# Patient Record
Sex: FEMALE | Race: WHITE | NOT HISPANIC OR LATINO
[De-identification: names, ages, dates, MRNs, and addresses within clinical notes are randomized per-mention and may not be internally consistent; named-entity substitution may affect disease eponyms.]

---

## 2019-06-07 ENCOUNTER — TRANSCRIPTION ENCOUNTER (OUTPATIENT)
Age: 49
End: 2019-06-07

## 2022-10-01 ENCOUNTER — INPATIENT (INPATIENT)
Facility: HOSPITAL | Age: 52
LOS: 0 days | Discharge: HOME | DRG: 395 | End: 2022-10-02
Attending: SURGERY | Admitting: SURGERY
Payer: COMMERCIAL

## 2022-10-01 VITALS
SYSTOLIC BLOOD PRESSURE: 133 MMHG | WEIGHT: 190.04 LBS | DIASTOLIC BLOOD PRESSURE: 85 MMHG | HEIGHT: 66 IN | TEMPERATURE: 98 F | HEART RATE: 86 BPM | OXYGEN SATURATION: 99 % | RESPIRATION RATE: 18 BRPM

## 2022-10-01 DIAGNOSIS — Z87.81 PERSONAL HISTORY OF (HEALED) TRAUMATIC FRACTURE: Chronic | ICD-10-CM

## 2022-10-01 DIAGNOSIS — K80.50 CALCULUS OF BILE DUCT WITHOUT CHOLANGITIS OR CHOLECYSTITIS WITHOUT OBSTRUCTION: ICD-10-CM

## 2022-10-01 LAB
ALBUMIN SERPL ELPH-MCNC: 4.5 G/DL — SIGNIFICANT CHANGE UP (ref 3.3–5.2)
ALP SERPL-CCNC: 96 U/L — SIGNIFICANT CHANGE UP (ref 40–120)
ALT FLD-CCNC: 11 U/L — SIGNIFICANT CHANGE UP
ANION GAP SERPL CALC-SCNC: 12 MMOL/L — SIGNIFICANT CHANGE UP (ref 5–17)
APPEARANCE UR: CLEAR — SIGNIFICANT CHANGE UP
APTT BLD: 34 SEC — SIGNIFICANT CHANGE UP (ref 27.5–35.5)
AST SERPL-CCNC: 13 U/L — SIGNIFICANT CHANGE UP
BACTERIA # UR AUTO: ABNORMAL
BASOPHILS # BLD AUTO: 0.03 K/UL — SIGNIFICANT CHANGE UP (ref 0–0.2)
BASOPHILS NFR BLD AUTO: 0.4 % — SIGNIFICANT CHANGE UP (ref 0–2)
BILIRUB SERPL-MCNC: 0.4 MG/DL — SIGNIFICANT CHANGE UP (ref 0.4–2)
BILIRUB UR-MCNC: NEGATIVE — SIGNIFICANT CHANGE UP
BLD GP AB SCN SERPL QL: SIGNIFICANT CHANGE UP
BUN SERPL-MCNC: 15.3 MG/DL — SIGNIFICANT CHANGE UP (ref 8–20)
CALCIUM SERPL-MCNC: 9.4 MG/DL — SIGNIFICANT CHANGE UP (ref 8.4–10.5)
CHLORIDE SERPL-SCNC: 103 MMOL/L — SIGNIFICANT CHANGE UP (ref 98–107)
CO2 SERPL-SCNC: 26 MMOL/L — SIGNIFICANT CHANGE UP (ref 22–29)
COLOR SPEC: YELLOW — SIGNIFICANT CHANGE UP
CREAT SERPL-MCNC: 0.76 MG/DL — SIGNIFICANT CHANGE UP (ref 0.5–1.3)
DIFF PNL FLD: NEGATIVE — SIGNIFICANT CHANGE UP
EGFR: 94 ML/MIN/1.73M2 — SIGNIFICANT CHANGE UP
EOSINOPHIL # BLD AUTO: 0.23 K/UL — SIGNIFICANT CHANGE UP (ref 0–0.5)
EOSINOPHIL NFR BLD AUTO: 3.2 % — SIGNIFICANT CHANGE UP (ref 0–6)
EPI CELLS # UR: SIGNIFICANT CHANGE UP
GLUCOSE SERPL-MCNC: 90 MG/DL — SIGNIFICANT CHANGE UP (ref 70–99)
GLUCOSE UR QL: NEGATIVE MG/DL — SIGNIFICANT CHANGE UP
HCG SERPL-ACNC: 6.5 MIU/ML — SIGNIFICANT CHANGE UP
HCT VFR BLD CALC: 40.5 % — SIGNIFICANT CHANGE UP (ref 34.5–45)
HGB BLD-MCNC: 13.3 G/DL — SIGNIFICANT CHANGE UP (ref 11.5–15.5)
IMM GRANULOCYTES NFR BLD AUTO: 0.3 % — SIGNIFICANT CHANGE UP (ref 0–0.9)
INR BLD: 1.09 RATIO — SIGNIFICANT CHANGE UP (ref 0.88–1.16)
KETONES UR-MCNC: NEGATIVE — SIGNIFICANT CHANGE UP
LEUKOCYTE ESTERASE UR-ACNC: NEGATIVE — SIGNIFICANT CHANGE UP
LIDOCAIN IGE QN: 28 U/L — SIGNIFICANT CHANGE UP (ref 22–51)
LYMPHOCYTES # BLD AUTO: 1.41 K/UL — SIGNIFICANT CHANGE UP (ref 1–3.3)
LYMPHOCYTES # BLD AUTO: 19.4 % — SIGNIFICANT CHANGE UP (ref 13–44)
MCHC RBC-ENTMCNC: 28.9 PG — SIGNIFICANT CHANGE UP (ref 27–34)
MCHC RBC-ENTMCNC: 32.8 GM/DL — SIGNIFICANT CHANGE UP (ref 32–36)
MCV RBC AUTO: 87.9 FL — SIGNIFICANT CHANGE UP (ref 80–100)
MONOCYTES # BLD AUTO: 0.54 K/UL — SIGNIFICANT CHANGE UP (ref 0–0.9)
MONOCYTES NFR BLD AUTO: 7.4 % — SIGNIFICANT CHANGE UP (ref 2–14)
NEUTROPHILS # BLD AUTO: 5.03 K/UL — SIGNIFICANT CHANGE UP (ref 1.8–7.4)
NEUTROPHILS NFR BLD AUTO: 69.3 % — SIGNIFICANT CHANGE UP (ref 43–77)
NITRITE UR-MCNC: NEGATIVE — SIGNIFICANT CHANGE UP
PH UR: 5 — SIGNIFICANT CHANGE UP (ref 5–8)
PLATELET # BLD AUTO: 236 K/UL — SIGNIFICANT CHANGE UP (ref 150–400)
POTASSIUM SERPL-MCNC: 4.2 MMOL/L — SIGNIFICANT CHANGE UP (ref 3.5–5.3)
POTASSIUM SERPL-SCNC: 4.2 MMOL/L — SIGNIFICANT CHANGE UP (ref 3.5–5.3)
PROT SERPL-MCNC: 7.5 G/DL — SIGNIFICANT CHANGE UP (ref 6.6–8.7)
PROT UR-MCNC: NEGATIVE — SIGNIFICANT CHANGE UP
PROTHROM AB SERPL-ACNC: 12.6 SEC — SIGNIFICANT CHANGE UP (ref 10.5–13.4)
RBC # BLD: 4.61 M/UL — SIGNIFICANT CHANGE UP (ref 3.8–5.2)
RBC # FLD: 12.7 % — SIGNIFICANT CHANGE UP (ref 10.3–14.5)
RBC CASTS # UR COMP ASSIST: SIGNIFICANT CHANGE UP /HPF (ref 0–4)
SARS-COV-2 RNA SPEC QL NAA+PROBE: SIGNIFICANT CHANGE UP
SODIUM SERPL-SCNC: 140 MMOL/L — SIGNIFICANT CHANGE UP (ref 135–145)
SP GR SPEC: 1.01 — SIGNIFICANT CHANGE UP (ref 1.01–1.02)
UROBILINOGEN FLD QL: NEGATIVE MG/DL — SIGNIFICANT CHANGE UP
WBC # BLD: 7.26 K/UL — SIGNIFICANT CHANGE UP (ref 3.8–10.5)
WBC # FLD AUTO: 7.26 K/UL — SIGNIFICANT CHANGE UP (ref 3.8–10.5)
WBC UR QL: SIGNIFICANT CHANGE UP /HPF (ref 0–5)

## 2022-10-01 PROCEDURE — 74177 CT ABD & PELVIS W/CONTRAST: CPT | Mod: 26,ME

## 2022-10-01 PROCEDURE — 93010 ELECTROCARDIOGRAM REPORT: CPT

## 2022-10-01 PROCEDURE — 76705 ECHO EXAM OF ABDOMEN: CPT | Mod: 26,59

## 2022-10-01 PROCEDURE — G1004: CPT

## 2022-10-01 PROCEDURE — 76856 US EXAM PELVIC COMPLETE: CPT | Mod: 26,59

## 2022-10-01 PROCEDURE — 93975 VASCULAR STUDY: CPT | Mod: 26

## 2022-10-01 PROCEDURE — 76830 TRANSVAGINAL US NON-OB: CPT | Mod: 26

## 2022-10-01 PROCEDURE — 71045 X-RAY EXAM CHEST 1 VIEW: CPT | Mod: 26

## 2022-10-01 PROCEDURE — 99284 EMERGENCY DEPT VISIT MOD MDM: CPT

## 2022-10-01 RX ORDER — SODIUM CHLORIDE 9 MG/ML
1000 INJECTION INTRAMUSCULAR; INTRAVENOUS; SUBCUTANEOUS ONCE
Refills: 0 | Status: COMPLETED | OUTPATIENT
Start: 2022-10-01 | End: 2022-10-01

## 2022-10-01 RX ORDER — ACETAMINOPHEN 500 MG
1000 TABLET ORAL ONCE
Refills: 0 | Status: COMPLETED | OUTPATIENT
Start: 2022-10-01 | End: 2022-10-01

## 2022-10-01 RX ORDER — SODIUM CHLORIDE 9 MG/ML
1000 INJECTION, SOLUTION INTRAVENOUS
Refills: 0 | Status: DISCONTINUED | OUTPATIENT
Start: 2022-10-01 | End: 2022-10-02

## 2022-10-01 RX ORDER — KETOROLAC TROMETHAMINE 30 MG/ML
15 SYRINGE (ML) INJECTION EVERY 4 HOURS
Refills: 0 | Status: DISCONTINUED | OUTPATIENT
Start: 2022-10-01 | End: 2022-10-02

## 2022-10-01 RX ORDER — ONDANSETRON 8 MG/1
4 TABLET, FILM COATED ORAL ONCE
Refills: 0 | Status: COMPLETED | OUTPATIENT
Start: 2022-10-01 | End: 2022-10-01

## 2022-10-01 RX ORDER — MORPHINE SULFATE 50 MG/1
4 CAPSULE, EXTENDED RELEASE ORAL ONCE
Refills: 0 | Status: DISCONTINUED | OUTPATIENT
Start: 2022-10-01 | End: 2022-10-01

## 2022-10-01 RX ORDER — ACETAMINOPHEN 500 MG
650 TABLET ORAL EVERY 6 HOURS
Refills: 0 | Status: DISCONTINUED | OUTPATIENT
Start: 2022-10-01 | End: 2022-10-02

## 2022-10-01 RX ORDER — ENOXAPARIN SODIUM 100 MG/ML
40 INJECTION SUBCUTANEOUS EVERY 24 HOURS
Refills: 0 | Status: DISCONTINUED | OUTPATIENT
Start: 2022-10-02 | End: 2022-10-02

## 2022-10-01 RX ORDER — TRAMADOL HYDROCHLORIDE 50 MG/1
50 TABLET ORAL EVERY 6 HOURS
Refills: 0 | Status: DISCONTINUED | OUTPATIENT
Start: 2022-10-01 | End: 2022-10-02

## 2022-10-01 RX ADMIN — Medication 15 MILLIGRAM(S): at 20:07

## 2022-10-01 RX ADMIN — MORPHINE SULFATE 4 MILLIGRAM(S): 50 CAPSULE, EXTENDED RELEASE ORAL at 11:52

## 2022-10-01 RX ADMIN — SODIUM CHLORIDE 1000 MILLILITER(S): 9 INJECTION INTRAMUSCULAR; INTRAVENOUS; SUBCUTANEOUS at 12:55

## 2022-10-01 RX ADMIN — SODIUM CHLORIDE 1000 MILLILITER(S): 9 INJECTION INTRAMUSCULAR; INTRAVENOUS; SUBCUTANEOUS at 11:52

## 2022-10-01 RX ADMIN — Medication 400 MILLIGRAM(S): at 11:53

## 2022-10-01 RX ADMIN — ONDANSETRON 4 MILLIGRAM(S): 8 TABLET, FILM COATED ORAL at 13:08

## 2022-10-01 NOTE — H&P ADULT - ATTENDING COMMENTS
52yo F w PMH of hyperthyroidism (resolved?) presents with 3 days of worsening abdominal pain. Localized to RUQ to RLQ, described as sharp and constant. No alleviating or aggravating factors, but reproducible on palpation. Denies nausea, vomiting, fevers, chills, chest pain, or SOB. Last meal yesterday afternoon, BM's and urination normal. Has never had a colonoscopy.   Hemodynamic intact  Bilateral BS  Abdomen soft , quite tender in RUQ with limited mild rebound and no guarding  Cholelithiasis  In this setting in absence of LFTs elevation and other symptoms, biliary colic is possible, but less likely. Most likely in the differential setting, this patient has torsion of appendices epiploicae in the area of hepatic flexure  Will admit and observe and act accordingly based on clinical picture

## 2022-10-01 NOTE — H&P ADULT - HISTORY OF PRESENT ILLNESS
50yo F w PMH of hyperthyroidism (resolved?) presents with 3 days of worsening abdominal pain. Localized to RUQ to RLQ, described as sharp and constant. No alleviating or aggravating factors, but reproducible on palpation. Denies nausea, vomiting, fevers, chills, chest pain, or SOB. Last meal yesterday afternoon, BM's and urination normal. Has never had a colonoscopy.

## 2022-10-01 NOTE — ED STATDOCS - GASTROINTESTINAL, MLM
abdomen soft, +Coyle's sign at RUQ and tenderness to palpation RLQ with guarding, and non-distended. abdomen soft, non-tender, and non-distended. Bowel sounds present.

## 2022-10-01 NOTE — ED STATDOCS - OBJECTIVE STATEMENT
52 y/o female with no pmhx, c/o constant R sided abdominal pain x 2 days, which has progressively worsened. Unable to get comfortable and pain worse with sitting. Denies dysuria. No hx of gallstone or kidney stones. Denies nausea or vomiting. No allergies.

## 2022-10-01 NOTE — ED ADULT NURSE NOTE - OBJECTIVE STATEMENT
Pt is here with RUQ pain since Thursday.  Pt denies N/V/D, denies fever.  P/s pain came on suddenly.  #20G IVHL inserted to LAC, medicated for pain as ordered.  IVF infusing without s/s redness or swelling noted.

## 2022-10-01 NOTE — H&P ADULT - NSHPPHYSICALEXAM_GEN_ALL_CORE
GENERAL: Sitting in recliner in NAD, well-developed, following commands  HEAD:  Atraumatic, Normocephalic  EYES: EOMI, conjunctiva and sclera clear  NECK: Supple, full ROM  CHEST/LUNG: Unlabored, no conversational dyspnea  HEART: Regular rate and rhythm;   ABDOMEN: Soft,  significantly tender in RUQ Nondistended; no rebound or guarding  EXTREMITIES: WWP throughout No clubbing, cyanosis, or edema  PSYCH: AAOx3  NEUROLOGY: non-focal  SKIN: No rashes or lesions

## 2022-10-01 NOTE — H&P ADULT - NSHPLABSRESULTS_GEN_ALL_CORE
Vital Signs Last 24 Hrs  T(C): 36.7 (01 Oct 2022 17:30), Max: 36.9 (01 Oct 2022 09:58)  T(F): 98.1 (01 Oct 2022 17:30), Max: 98.5 (01 Oct 2022 09:58)  HR: 58 (01 Oct 2022 17:30) (58 - 86)  BP: 130/85 (01 Oct 2022 17:30) (119/61 - 133/85)  BP(mean): --  RR: 18 (01 Oct 2022 17:30) (17 - 18)  SpO2: 64% (01 Oct 2022 17:30) (64% - 99%)    Parameters below as of 01 Oct 2022 17:30  Patient On (Oxygen Delivery Method): room air        LABS:                        13.3   7.26  )-----------( 236      ( 01 Oct 2022 11:55 )             40.5     10    140  |  103  |  15.3  ----------------------------<  90  4.2   |  26.0  |  0.76    Ca    9.4      01 Oct 2022 11:55    TPro  7.5  /  Alb  4.5  /  TBili  0.4  /  DBili  x   /  AST  13  /  ALT  11  /  AlkPhos  96  10-01      Urinalysis Basic - ( 01 Oct 2022 13:03 )    Color: Yellow / Appearance: Clear / S.015 / pH: x  Gluc: x / Ketone: Negative  / Bili: Negative / Urobili: Negative mg/dL   Blood: x / Protein: Negative / Nitrite: Negative   Leuk Esterase: Negative / RBC: 0-2 /HPF / WBC 0-2 /HPF   Sq Epi: x / Non Sq Epi: Occasional / Bacteria: Occasional      IMAGING:  RUQ U/S  IMPRESSION:  Technically limited by bowel gas and patient's difficulty with breath-holding. No evidence of acute cholecystitis. Cholelithiasis. Question gallbladder polyp.    DARLEEN JOSEPH MD; Attending Radiologist  This document has been electronically signed. Oct 1 2022 12:20PM        CT A/P  IMPRESSION:  Normal appendix.  Heterogeneous right adnexal mass with adjacent small amount of fluid. This mass cannot be well characterized on CT. Recommend further evaluation with pelvic ultrasound to rule out right ovarian torsion or other adnexal pathology.  Fat stranding/edema adjacent to the right transverse colon without underlying colonic abnormality. Findings are nonspecific and differential diagnosis includes focal fat necrosis, focal omental necrosis or epiploic appendagitis.  Moderate hiatal hernia.  VERTEBRAL BODY ANALYSIS: No Vertebral fracture or low bone density identified.    ITA LIN MD; Attending Radiologist  This document has been electronically signed. Oct 1 2022 5:58PM

## 2022-10-01 NOTE — ED STATDOCS - ATTENDING APP SHARED VISIT CONTRIBUTION OF CARE
FE Villareal: see mdm    I have personally performed a face to face diagnostic evaluation on this patient.  I have reviewed the ACP note and agree with the history, exam, and plan of care, except as noted.   My medical decision making and observations are found above.

## 2022-10-01 NOTE — ED STATDOCS - NS ED ATTENDING STATEMENT MOD
This was a shared visit with the MIGUEL ANGEL. I reviewed and verified the documentation and independently performed the documented:

## 2022-10-01 NOTE — H&P ADULT - ASSESSMENT
52yo F w PMH of hyperthyroidism (resolved?) presents with 3 days of worsening abdominal pain, localized to RUQ to RLQ. Imaging with gallstones, pain reproducible and concerning for biliary colic. No leukocytosis or fevers. CT also with concerns for fat/omental necrosis vs epiploic appendagitis and possible adnexal pathology. Will admit and plan for cholecystectomy.     # Biliary Colic  - Admit to ACS under Dr. Tierney  - Adding on for lap yogesh tomorrow  - PRN pain meds, ketorolac ATC  - CLD, NPO @ midnight w IVF  - Pelvic U/S to eval for adnexal pathology  - AM Labs  - DVT ppx      Patient seen and plan discussed with Dr. Tierney who agrees.

## 2022-10-01 NOTE — ED STATDOCS - CLINICAL SUMMARY MEDICAL DECISION MAKING FREE TEXT BOX
52 y/o female p/w abdomina discomfort. On exam has +Coyle's sign and also tenderness to palpation at RLQ. Will check labs, imagining, treat discomfort, follow studies, reassess, and dispo.

## 2022-10-01 NOTE — ED STATDOCS - PROGRESS NOTE DETAILS
PT evaluated by intake physician. HPI/PE/ROS as noted above. Will follow up plan per intake physician. Pt reassessed. Pt states she is still in some pain in the RUQ area, does not want anything else for pain at this time. Labs and US results explained. CT pending. FE Villareal: d/w patient that her hcg was indeterminate zone, notes that it returns elevated due to hx of pituitary tumor, d/w rads OK to proceed to CT, patient still with discomfort though imprvoed to 4/10 after meds. CT findings acknowledged. VSS. No ttp in the RLQ, +ttp in the RUQ. Pt will be admitted to sx for further assessment and evaluation.

## 2022-10-02 ENCOUNTER — TRANSCRIPTION ENCOUNTER (OUTPATIENT)
Age: 52
End: 2022-10-02

## 2022-10-02 VITALS
HEART RATE: 60 BPM | RESPIRATION RATE: 18 BRPM | OXYGEN SATURATION: 95 % | DIASTOLIC BLOOD PRESSURE: 80 MMHG | TEMPERATURE: 98 F | SYSTOLIC BLOOD PRESSURE: 128 MMHG

## 2022-10-02 LAB
ANION GAP SERPL CALC-SCNC: 11 MMOL/L — SIGNIFICANT CHANGE UP (ref 5–17)
BASOPHILS # BLD AUTO: 0.03 K/UL — SIGNIFICANT CHANGE UP (ref 0–0.2)
BASOPHILS NFR BLD AUTO: 0.5 % — SIGNIFICANT CHANGE UP (ref 0–2)
BUN SERPL-MCNC: 10.6 MG/DL — SIGNIFICANT CHANGE UP (ref 8–20)
CALCIUM SERPL-MCNC: 9.2 MG/DL — SIGNIFICANT CHANGE UP (ref 8.4–10.5)
CHLORIDE SERPL-SCNC: 105 MMOL/L — SIGNIFICANT CHANGE UP (ref 98–107)
CO2 SERPL-SCNC: 24 MMOL/L — SIGNIFICANT CHANGE UP (ref 22–29)
CREAT SERPL-MCNC: 0.72 MG/DL — SIGNIFICANT CHANGE UP (ref 0.5–1.3)
CULTURE RESULTS: SIGNIFICANT CHANGE UP
EGFR: 100 ML/MIN/1.73M2 — SIGNIFICANT CHANGE UP
EOSINOPHIL # BLD AUTO: 0.2 K/UL — SIGNIFICANT CHANGE UP (ref 0–0.5)
EOSINOPHIL NFR BLD AUTO: 3.5 % — SIGNIFICANT CHANGE UP (ref 0–6)
GLUCOSE SERPL-MCNC: 77 MG/DL — SIGNIFICANT CHANGE UP (ref 70–99)
HCG SERPL-ACNC: 6.2 MIU/ML — SIGNIFICANT CHANGE UP
HCT VFR BLD CALC: 38.3 % — SIGNIFICANT CHANGE UP (ref 34.5–45)
HGB BLD-MCNC: 12.6 G/DL — SIGNIFICANT CHANGE UP (ref 11.5–15.5)
IMM GRANULOCYTES NFR BLD AUTO: 0.2 % — SIGNIFICANT CHANGE UP (ref 0–0.9)
LYMPHOCYTES # BLD AUTO: 1.16 K/UL — SIGNIFICANT CHANGE UP (ref 1–3.3)
LYMPHOCYTES # BLD AUTO: 20.2 % — SIGNIFICANT CHANGE UP (ref 13–44)
MAGNESIUM SERPL-MCNC: 2 MG/DL — SIGNIFICANT CHANGE UP (ref 1.6–2.6)
MCHC RBC-ENTMCNC: 29.1 PG — SIGNIFICANT CHANGE UP (ref 27–34)
MCHC RBC-ENTMCNC: 32.9 GM/DL — SIGNIFICANT CHANGE UP (ref 32–36)
MCV RBC AUTO: 88.5 FL — SIGNIFICANT CHANGE UP (ref 80–100)
MONOCYTES # BLD AUTO: 0.44 K/UL — SIGNIFICANT CHANGE UP (ref 0–0.9)
MONOCYTES NFR BLD AUTO: 7.7 % — SIGNIFICANT CHANGE UP (ref 2–14)
NEUTROPHILS # BLD AUTO: 3.9 K/UL — SIGNIFICANT CHANGE UP (ref 1.8–7.4)
NEUTROPHILS NFR BLD AUTO: 67.9 % — SIGNIFICANT CHANGE UP (ref 43–77)
PHOSPHATE SERPL-MCNC: 3.3 MG/DL — SIGNIFICANT CHANGE UP (ref 2.4–4.7)
PLATELET # BLD AUTO: 194 K/UL — SIGNIFICANT CHANGE UP (ref 150–400)
POTASSIUM SERPL-MCNC: 3.9 MMOL/L — SIGNIFICANT CHANGE UP (ref 3.5–5.3)
POTASSIUM SERPL-SCNC: 3.9 MMOL/L — SIGNIFICANT CHANGE UP (ref 3.5–5.3)
RBC # BLD: 4.33 M/UL — SIGNIFICANT CHANGE UP (ref 3.8–5.2)
RBC # FLD: 12.4 % — SIGNIFICANT CHANGE UP (ref 10.3–14.5)
SODIUM SERPL-SCNC: 140 MMOL/L — SIGNIFICANT CHANGE UP (ref 135–145)
SPECIMEN SOURCE: SIGNIFICANT CHANGE UP
WBC # BLD: 5.74 K/UL — SIGNIFICANT CHANGE UP (ref 3.8–10.5)
WBC # FLD AUTO: 5.74 K/UL — SIGNIFICANT CHANGE UP (ref 3.8–10.5)

## 2022-10-02 PROCEDURE — 86850 RBC ANTIBODY SCREEN: CPT

## 2022-10-02 PROCEDURE — G1004: CPT

## 2022-10-02 PROCEDURE — 96375 TX/PRO/DX INJ NEW DRUG ADDON: CPT

## 2022-10-02 PROCEDURE — 83690 ASSAY OF LIPASE: CPT

## 2022-10-02 PROCEDURE — 74177 CT ABD & PELVIS W/CONTRAST: CPT | Mod: ME

## 2022-10-02 PROCEDURE — 85610 PROTHROMBIN TIME: CPT

## 2022-10-02 PROCEDURE — 84100 ASSAY OF PHOSPHORUS: CPT

## 2022-10-02 PROCEDURE — 99285 EMERGENCY DEPT VISIT HI MDM: CPT | Mod: 25

## 2022-10-02 PROCEDURE — 83735 ASSAY OF MAGNESIUM: CPT

## 2022-10-02 PROCEDURE — 85025 COMPLETE CBC W/AUTO DIFF WBC: CPT

## 2022-10-02 PROCEDURE — 80048 BASIC METABOLIC PNL TOTAL CA: CPT

## 2022-10-02 PROCEDURE — 71045 X-RAY EXAM CHEST 1 VIEW: CPT

## 2022-10-02 PROCEDURE — 87086 URINE CULTURE/COLONY COUNT: CPT

## 2022-10-02 PROCEDURE — 76856 US EXAM PELVIC COMPLETE: CPT

## 2022-10-02 PROCEDURE — U0005: CPT

## 2022-10-02 PROCEDURE — 93975 VASCULAR STUDY: CPT

## 2022-10-02 PROCEDURE — 76705 ECHO EXAM OF ABDOMEN: CPT

## 2022-10-02 PROCEDURE — 85730 THROMBOPLASTIN TIME PARTIAL: CPT

## 2022-10-02 PROCEDURE — 96374 THER/PROPH/DIAG INJ IV PUSH: CPT

## 2022-10-02 PROCEDURE — 81001 URINALYSIS AUTO W/SCOPE: CPT

## 2022-10-02 PROCEDURE — 84702 CHORIONIC GONADOTROPIN TEST: CPT

## 2022-10-02 PROCEDURE — U0003: CPT

## 2022-10-02 PROCEDURE — 80053 COMPREHEN METABOLIC PANEL: CPT

## 2022-10-02 PROCEDURE — 99231 SBSQ HOSP IP/OBS SF/LOW 25: CPT | Mod: GC

## 2022-10-02 PROCEDURE — 93005 ELECTROCARDIOGRAM TRACING: CPT

## 2022-10-02 PROCEDURE — 36415 COLL VENOUS BLD VENIPUNCTURE: CPT

## 2022-10-02 PROCEDURE — 86901 BLOOD TYPING SEROLOGIC RH(D): CPT

## 2022-10-02 PROCEDURE — 76830 TRANSVAGINAL US NON-OB: CPT

## 2022-10-02 PROCEDURE — 86900 BLOOD TYPING SEROLOGIC ABO: CPT

## 2022-10-02 RX ORDER — IBUPROFEN 200 MG
1 TABLET ORAL
Qty: 56 | Refills: 0
Start: 2022-10-02 | End: 2022-10-15

## 2022-10-02 RX ORDER — INFLUENZA VIRUS VACCINE 15; 15; 15; 15 UG/.5ML; UG/.5ML; UG/.5ML; UG/.5ML
0.5 SUSPENSION INTRAMUSCULAR ONCE
Refills: 0 | Status: DISCONTINUED | OUTPATIENT
Start: 2022-10-02 | End: 2022-10-02

## 2022-10-02 RX ORDER — PANTOPRAZOLE SODIUM 20 MG/1
1 TABLET, DELAYED RELEASE ORAL
Qty: 0 | Refills: 0 | DISCHARGE
Start: 2022-10-02

## 2022-10-02 RX ORDER — ACETAMINOPHEN 500 MG
2 TABLET ORAL
Qty: 240 | Refills: 0
Start: 2022-10-02 | End: 2022-10-31

## 2022-10-02 RX ORDER — PANTOPRAZOLE SODIUM 20 MG/1
1 TABLET, DELAYED RELEASE ORAL
Qty: 30 | Refills: 0
Start: 2022-10-02

## 2022-10-02 RX ORDER — PANTOPRAZOLE SODIUM 20 MG/1
40 TABLET, DELAYED RELEASE ORAL
Refills: 0 | Status: DISCONTINUED | OUTPATIENT
Start: 2022-10-02 | End: 2022-10-02

## 2022-10-02 RX ADMIN — SODIUM CHLORIDE 120 MILLILITER(S): 9 INJECTION, SOLUTION INTRAVENOUS at 01:30

## 2022-10-02 RX ADMIN — Medication 15 MILLIGRAM(S): at 13:00

## 2022-10-02 RX ADMIN — Medication 15 MILLIGRAM(S): at 17:31

## 2022-10-02 RX ADMIN — Medication 15 MILLIGRAM(S): at 13:30

## 2022-10-02 RX ADMIN — PANTOPRAZOLE SODIUM 40 MILLIGRAM(S): 20 TABLET, DELAYED RELEASE ORAL at 12:59

## 2022-10-02 RX ADMIN — SODIUM CHLORIDE 120 MILLILITER(S): 9 INJECTION, SOLUTION INTRAVENOUS at 09:19

## 2022-10-02 NOTE — DISCHARGE NOTE PROVIDER - NSDCMRMEDTOKEN_GEN_ALL_CORE_FT
acetaminophen 325 mg oral tablet: 2 tab(s) orally every 6 hours, As needed, Moderate Pain (4 - 6)   mg oral tablet: 1 tab(s) orally every 6 hours   pantoprazole 40 mg oral delayed release tablet: 1 tab(s) orally once a day (before a meal)

## 2022-10-02 NOTE — DISCHARGE NOTE PROVIDER - HOSPITAL COURSE
Ms. Diaz is a 52 yo F who presents to the ED with a R sided abdominal pain. No additional associated symptoms. CT A/P demonstrates non-specific adjacent to the R transverse colon, on review appeared to be omental infarct. Low concern for appendicitis. Patient abdominal pain improved with NSAIDs. Labs wnl. Hemodynamically stable. Patient tolerated PO diet. Medically clear for discharge.

## 2022-10-02 NOTE — DISCHARGE NOTE NURSING/CASE MANAGEMENT/SOCIAL WORK - PATIENT PORTAL LINK FT
You can access the FollowMyHealth Patient Portal offered by [facility] by registering at the following website: [email or web address]. By joining [facility]’s FollowMyHealth portal, you will also be able to view your health information using other applications (apps) compatible with our system.

## 2022-10-02 NOTE — DISCHARGE NOTE PROVIDER - NSDCCPCAREPLAN_GEN_ALL_CORE_FT
PRINCIPAL DISCHARGE DIAGNOSIS  Diagnosis: Omental infarction  Assessment and Plan of Treatment:

## 2022-10-02 NOTE — DISCHARGE NOTE PROVIDER - CARE PROVIDER_API CALL
Kwaku West (MD)  Surgery  [address], [address] [place] [address]  Phone: [phone]  Fax: [phone]  Follow Up Time: Routine

## 2022-10-02 NOTE — PROGRESS NOTE ADULT - SUBJECTIVE AND OBJECTIVE BOX
HPI/OVERNIGHT EVENTS: pain improved otherwise stable, afebrile    Vital Signs Last 24 Hrs  T(C): 36.9 (02 Oct 2022 04:00), Max: 36.9 (01 Oct 2022 09:58)  T(F): 98.5 (02 Oct 2022 04:00), Max: 98.5 (01 Oct 2022 09:58)  HR: 67 (02 Oct 2022 04:00) (58 - 86)  BP: 121/76 (02 Oct 2022 04:00) (119/61 - 135/83)  BP(mean): --  RR: 16 (02 Oct 2022 04:00) (16 - 18)  SpO2: 98% (02 Oct 2022 04:00) (64% - 99%)    Parameters below as of 01 Oct 2022 23:53  Patient On (Oxygen Delivery Method): room air        I&O's Detail    01 Oct 2022 07:01  -  02 Oct 2022 06:26  --------------------------------------------------------  IN:    Lactated Ringers: 720 mL  Total IN: 720 mL    OUT:  Total OUT: 0 mL    Total NET: 720 mL          Constitutional: patient resting comfortably in bed, in no acute distress  HEENT: EOMI, PERRLA, MMM.  Respiratory: Non labored breathing on RA  Cardiovascular: RRR  Gastrointestinal: Abdomen soft, mild tenderness, non-distended, no rebound tenderness / guarding  Musculoskeletal: No joint pain, swelling or deformity; no limitation of movement  Vascular: Extremities warm and well perfused.     LABS:                        13.3   7.26  )-----------( 236      ( 01 Oct 2022 11:55 )             40.5     10-01    140  |  103  |  15.3  ----------------------------<  90  4.2   |  26.0  |  0.76    Ca    9.4      01 Oct 2022 11:55    TPro  7.5  /  Alb  4.5  /  TBili  0.4  /  DBili  x   /  AST  13  /  ALT  11  /  AlkPhos  96  10-01    PT/INR - ( 01 Oct 2022 20:25 )   PT: 12.6 sec;   INR: 1.09 ratio         PTT - ( 01 Oct 2022 20:25 )  PTT:34.0 sec  Urinalysis Basic - ( 01 Oct 2022 13:03 )    Color: Yellow / Appearance: Clear / S.015 / pH: x  Gluc: x / Ketone: Negative  / Bili: Negative / Urobili: Negative mg/dL   Blood: x / Protein: Negative / Nitrite: Negative   Leuk Esterase: Negative / RBC: 0-2 /HPF / WBC 0-2 /HPF   Sq Epi: x / Non Sq Epi: Occasional / Bacteria: Occasional        MEDICATIONS  (STANDING):  enoxaparin Injectable 40 milliGRAM(s) SubCutaneous every 24 hours  ketorolac   Injectable 15 milliGRAM(s) IV Push every 4 hours  lactated ringers. 1000 milliLiter(s) (120 mL/Hr) IV Continuous <Continuous>    MEDICATIONS  (PRN):  acetaminophen     Tablet .. 650 milliGRAM(s) Oral every 6 hours PRN Moderate Pain (4 - 6)  traMADol 50 milliGRAM(s) Oral every 6 hours PRN Severe Pain (7 - 10)      MICRO:   Cultures     STUDIES:   EKG, CXR, U/S, CT, MRI

## 2022-10-02 NOTE — DISCHARGE NOTE PROVIDER - NSDCFUADDINST_GEN_ALL_CORE_FT
Please take over the counter NSAIDs/Motrin every 6 hours , take tylenol as needed. Please return to ED if you experience worsening abdominal pain, fever/chills, inability to tolerate food by mouth.

## 2022-10-02 NOTE — PROGRESS NOTE ADULT - ATTENDING COMMENTS
Agree with above assessment.  The patient was seen and examined by myself with the surgical resident. The patient is still with right sided abdominal pain although less than yesterday.  She has no nausea, no vomit, no fevers or chills.  Abdomen is soft with right mid abdominal tenderness, no guarding, no rebound.  Abd/pel CT scan reveals findings compatible with omental infarction. Will advance PO, anti-inflammatories, possible D/C home later today.

## 2022-11-22 PROBLEM — E05.90 THYROTOXICOSIS, UNSPECIFIED WITHOUT THYROTOXIC CRISIS OR STORM: Chronic | Status: ACTIVE | Noted: 2022-10-01

## 2022-11-22 PROBLEM — Z00.00 ENCOUNTER FOR PREVENTIVE HEALTH EXAMINATION: Status: ACTIVE | Noted: 2022-11-22

## 2022-12-09 ENCOUNTER — APPOINTMENT (OUTPATIENT)
Dept: GYNECOLOGIC ONCOLOGY | Facility: CLINIC | Age: 52
End: 2022-12-09

## 2022-12-09 VITALS
WEIGHT: 197 LBS | HEIGHT: 66 IN | BODY MASS INDEX: 31.66 KG/M2 | DIASTOLIC BLOOD PRESSURE: 86 MMHG | HEART RATE: 88 BPM | OXYGEN SATURATION: 98 % | SYSTOLIC BLOOD PRESSURE: 130 MMHG

## 2022-12-09 DIAGNOSIS — Z86.39 PERSONAL HISTORY OF OTHER ENDOCRINE, NUTRITIONAL AND METABOLIC DISEASE: ICD-10-CM

## 2022-12-09 DIAGNOSIS — Z86.018 PERSONAL HISTORY OF OTHER BENIGN NEOPLASM: ICD-10-CM

## 2022-12-09 DIAGNOSIS — Z78.9 OTHER SPECIFIED HEALTH STATUS: ICD-10-CM

## 2022-12-09 PROCEDURE — 99204 OFFICE O/P NEW MOD 45 MIN: CPT

## 2022-12-09 RX ORDER — IBUPROFEN 600 MG/1
600 TABLET, FILM COATED ORAL
Qty: 56 | Refills: 0 | Status: DISCONTINUED | COMMUNITY
Start: 2022-10-02

## 2022-12-09 RX ORDER — PANTOPRAZOLE 40 MG/1
40 TABLET, DELAYED RELEASE ORAL
Qty: 30 | Refills: 0 | Status: DISCONTINUED | COMMUNITY
Start: 2022-10-02

## 2022-12-09 NOTE — DISCUSSION/SUMMARY
[Reviewed Radiology Report(s)] : Radiology reports were reviewed. [Reviewed Radiology Film/Image(s)] : Images from radiology studies were reviewed and examined. [Discuss Alternatives/Risks/Benefits w/Patient] : All alternatives, risks, and benefits were discussed with the patient/family and all questions were answered.  Patient expressed good understanding and appreciates the importance of follow up as recommended.

## 2022-12-11 ENCOUNTER — FORM ENCOUNTER (OUTPATIENT)
Age: 52
End: 2022-12-11

## 2022-12-11 ENCOUNTER — TRANSCRIPTION ENCOUNTER (OUTPATIENT)
Age: 52
End: 2022-12-11

## 2022-12-15 LAB
CA 125 (LABCORP): 16.1 U/ML
CANCER AG19-9 SERPL-ACNC: 7 U/ML
CEA SERPL-MCNC: 1 NG/ML
HE4X: 42.8 PMOL/L
POSTMENOPAUSAL ROMA: 1.04
PREMENOPAUSAL ROMA: 0.53
ROMA COMMENT: NORMAL

## 2022-12-19 ENCOUNTER — APPOINTMENT (OUTPATIENT)
Dept: GASTROENTEROLOGY | Facility: CLINIC | Age: 52
End: 2022-12-19

## 2022-12-19 VITALS
DIASTOLIC BLOOD PRESSURE: 103 MMHG | BODY MASS INDEX: 31.66 KG/M2 | HEART RATE: 84 BPM | WEIGHT: 197 LBS | HEIGHT: 66 IN | SYSTOLIC BLOOD PRESSURE: 133 MMHG

## 2022-12-19 DIAGNOSIS — Z12.11 ENCOUNTER FOR SCREENING FOR MALIGNANT NEOPLASM OF COLON: ICD-10-CM

## 2022-12-19 PROCEDURE — 99204 OFFICE O/P NEW MOD 45 MIN: CPT

## 2022-12-19 RX ORDER — SODIUM PICOSULFATE, MAGNESIUM OXIDE, AND ANHYDROUS CITRIC ACID 10; 3.5; 12 MG/160ML; G/160ML; G/160ML
10-3.5-12 MG-GM LIQUID ORAL
Qty: 2 | Refills: 0 | Status: ACTIVE | COMMUNITY
Start: 2022-12-19 | End: 1900-01-01

## 2022-12-22 NOTE — HISTORY OF PRESENT ILLNESS
[FreeTextEntry1] : 52 F hx of pituitary mass presenting for screening colonoscopy.  she reports in October she presented to [facility] for RUQ abdominal pain. Had imaging which showed right adnexal mass, gallstone, and right sided colitis. She followed up with GYN Oncology Dr. Cooper who is planning for 2/3/23 total hysterectomy, bilateral salpingectomy, right oophorectomy. Per patient Dr. Cooper wanted patient to have screening colonoscopy prior to surgery. She currently reports no abdominal pain, changes in bowel habits, nausea, vomiting, melena, hematochezia.

## 2022-12-22 NOTE — ASSESSMENT
[FreeTextEntry1] : 52 F presenting for initial screening colonoscopy. Was hospitalized in October for abdominal pain where right adnexal mass, right sided colitis, and gallstones were seen. Pain has resolved on its own. Likely cause of abdominal pain was from gallstones seen vs colitis. Likely viral source of colitis as it has resolved on its own.  Is scheduled for total hysterectomy, bilateral salpingectomy, right oophorectomy 2/3/23. Has never had screening colonoscopy. Will plan for colonoscopy. Discussed with patient prep, procedure, and risks. Verbalized understanding. Prep sent to pharmacy. All questions answered. Discussed with Dr. Lindo. \par \par Time based billing : Total time spent is 47 minutes for coordination of care, record review, physical exam, and patient visit.

## 2022-12-22 NOTE — PHYSICAL EXAM
[Normal] : oriented to person, place, and time [de-identified] : softly distended due to body habitus

## 2023-01-09 LAB — SARS-COV-2 N GENE NPH QL NAA+PROBE: NOT DETECTED

## 2023-01-10 ENCOUNTER — APPOINTMENT (OUTPATIENT)
Dept: GASTROENTEROLOGY | Facility: AMBULATORY MEDICAL SERVICES | Age: 53
End: 2023-01-10
Payer: COMMERCIAL

## 2023-01-10 ENCOUNTER — RESULT REVIEW (OUTPATIENT)
Age: 53
End: 2023-01-10

## 2023-01-10 PROCEDURE — 45380 COLONOSCOPY AND BIOPSY: CPT | Mod: PT

## 2023-01-17 ENCOUNTER — OUTPATIENT (OUTPATIENT)
Dept: OUTPATIENT SERVICES | Facility: HOSPITAL | Age: 53
LOS: 1 days | End: 2023-01-17
Payer: COMMERCIAL

## 2023-01-17 VITALS
HEIGHT: 66 IN | OXYGEN SATURATION: 99 % | DIASTOLIC BLOOD PRESSURE: 70 MMHG | RESPIRATION RATE: 20 BRPM | TEMPERATURE: 98 F | HEART RATE: 80 BPM | WEIGHT: 215.39 LBS | SYSTOLIC BLOOD PRESSURE: 100 MMHG

## 2023-01-17 DIAGNOSIS — R93.89 ABNORMAL FINDINGS ON DIAGNOSTIC IMAGING OF OTHER SPECIFIED BODY STRUCTURES: ICD-10-CM

## 2023-01-17 DIAGNOSIS — N94.89 OTHER SPECIFIED CONDITIONS ASSOCIATED WITH FEMALE GENITAL ORGANS AND MENSTRUAL CYCLE: ICD-10-CM

## 2023-01-17 DIAGNOSIS — Z29.9 ENCOUNTER FOR PROPHYLACTIC MEASURES, UNSPECIFIED: ICD-10-CM

## 2023-01-17 DIAGNOSIS — E05.90 THYROTOXICOSIS, UNSPECIFIED WITHOUT THYROTOXIC CRISIS OR STORM: ICD-10-CM

## 2023-01-17 DIAGNOSIS — Z01.818 ENCOUNTER FOR OTHER PREPROCEDURAL EXAMINATION: ICD-10-CM

## 2023-01-17 DIAGNOSIS — E66.9 OBESITY, UNSPECIFIED: ICD-10-CM

## 2023-01-17 DIAGNOSIS — Z87.81 PERSONAL HISTORY OF (HEALED) TRAUMATIC FRACTURE: Chronic | ICD-10-CM

## 2023-01-17 LAB
A1C WITH ESTIMATED AVERAGE GLUCOSE RESULT: 5.1 % — SIGNIFICANT CHANGE UP (ref 4–5.6)
ALBUMIN SERPL ELPH-MCNC: 4.7 G/DL — SIGNIFICANT CHANGE UP (ref 3.3–5.2)
ALP SERPL-CCNC: 92 U/L — SIGNIFICANT CHANGE UP (ref 40–120)
ALT FLD-CCNC: 9 U/L — SIGNIFICANT CHANGE UP
ANION GAP SERPL CALC-SCNC: 9 MMOL/L — SIGNIFICANT CHANGE UP (ref 5–17)
APPEARANCE UR: ABNORMAL
APTT BLD: 33.4 SEC — SIGNIFICANT CHANGE UP (ref 27.5–35.5)
AST SERPL-CCNC: 16 U/L — SIGNIFICANT CHANGE UP
BACTERIA # UR AUTO: ABNORMAL
BASOPHILS # BLD AUTO: 0.05 K/UL — SIGNIFICANT CHANGE UP (ref 0–0.2)
BASOPHILS NFR BLD AUTO: 0.8 % — SIGNIFICANT CHANGE UP (ref 0–2)
BILIRUB SERPL-MCNC: <0.2 MG/DL — LOW (ref 0.4–2)
BILIRUB UR-MCNC: NEGATIVE — SIGNIFICANT CHANGE UP
BLD GP AB SCN SERPL QL: SIGNIFICANT CHANGE UP
BUN SERPL-MCNC: 17.3 MG/DL — SIGNIFICANT CHANGE UP (ref 8–20)
CALCIUM SERPL-MCNC: 8.8 MG/DL — SIGNIFICANT CHANGE UP (ref 8.4–10.5)
CHLORIDE SERPL-SCNC: 107 MMOL/L — SIGNIFICANT CHANGE UP (ref 96–108)
CO2 SERPL-SCNC: 27 MMOL/L — SIGNIFICANT CHANGE UP (ref 22–29)
COLOR SPEC: YELLOW — SIGNIFICANT CHANGE UP
COMMENT - URINE: SIGNIFICANT CHANGE UP
CREAT SERPL-MCNC: 0.79 MG/DL — SIGNIFICANT CHANGE UP (ref 0.5–1.3)
DIFF PNL FLD: NEGATIVE — SIGNIFICANT CHANGE UP
EGFR: 89 ML/MIN/1.73M2 — SIGNIFICANT CHANGE UP
EOSINOPHIL # BLD AUTO: 0.26 K/UL — SIGNIFICANT CHANGE UP (ref 0–0.5)
EOSINOPHIL NFR BLD AUTO: 4 % — SIGNIFICANT CHANGE UP (ref 0–6)
EPI CELLS # UR: SIGNIFICANT CHANGE UP
ESTIMATED AVERAGE GLUCOSE: 100 MG/DL — SIGNIFICANT CHANGE UP (ref 68–114)
GLUCOSE SERPL-MCNC: 100 MG/DL — HIGH (ref 70–99)
GLUCOSE UR QL: NEGATIVE MG/DL — SIGNIFICANT CHANGE UP
HCT VFR BLD CALC: 41.2 % — SIGNIFICANT CHANGE UP (ref 34.5–45)
HGB BLD-MCNC: 13 G/DL — SIGNIFICANT CHANGE UP (ref 11.5–15.5)
IMM GRANULOCYTES NFR BLD AUTO: 0.3 % — SIGNIFICANT CHANGE UP (ref 0–0.9)
INR BLD: 0.99 RATIO — SIGNIFICANT CHANGE UP (ref 0.88–1.16)
KETONES UR-MCNC: ABNORMAL
LEUKOCYTE ESTERASE UR-ACNC: NEGATIVE — SIGNIFICANT CHANGE UP
LYMPHOCYTES # BLD AUTO: 1.55 K/UL — SIGNIFICANT CHANGE UP (ref 1–3.3)
LYMPHOCYTES # BLD AUTO: 24 % — SIGNIFICANT CHANGE UP (ref 13–44)
MAGNESIUM SERPL-MCNC: 2.2 MG/DL — SIGNIFICANT CHANGE UP (ref 1.6–2.6)
MCHC RBC-ENTMCNC: 28.1 PG — SIGNIFICANT CHANGE UP (ref 27–34)
MCHC RBC-ENTMCNC: 31.6 GM/DL — LOW (ref 32–36)
MCV RBC AUTO: 89 FL — SIGNIFICANT CHANGE UP (ref 80–100)
MONOCYTES # BLD AUTO: 0.38 K/UL — SIGNIFICANT CHANGE UP (ref 0–0.9)
MONOCYTES NFR BLD AUTO: 5.9 % — SIGNIFICANT CHANGE UP (ref 2–14)
NEUTROPHILS # BLD AUTO: 4.19 K/UL — SIGNIFICANT CHANGE UP (ref 1.8–7.4)
NEUTROPHILS NFR BLD AUTO: 65 % — SIGNIFICANT CHANGE UP (ref 43–77)
NITRITE UR-MCNC: NEGATIVE — SIGNIFICANT CHANGE UP
PH UR: 5 — SIGNIFICANT CHANGE UP (ref 5–8)
PHOSPHATE SERPL-MCNC: 3 MG/DL — SIGNIFICANT CHANGE UP (ref 2.4–4.7)
PLATELET # BLD AUTO: 252 K/UL — SIGNIFICANT CHANGE UP (ref 150–400)
POTASSIUM SERPL-MCNC: 4.3 MMOL/L — SIGNIFICANT CHANGE UP (ref 3.5–5.3)
POTASSIUM SERPL-SCNC: 4.3 MMOL/L — SIGNIFICANT CHANGE UP (ref 3.5–5.3)
PROT SERPL-MCNC: 7.4 G/DL — SIGNIFICANT CHANGE UP (ref 6.6–8.7)
PROT UR-MCNC: SIGNIFICANT CHANGE UP MG/DL
PROTHROM AB SERPL-ACNC: 11.5 SEC — SIGNIFICANT CHANGE UP (ref 10.5–13.4)
RBC # BLD: 4.63 M/UL — SIGNIFICANT CHANGE UP (ref 3.8–5.2)
RBC # FLD: 12.9 % — SIGNIFICANT CHANGE UP (ref 10.3–14.5)
RBC CASTS # UR COMP ASSIST: SIGNIFICANT CHANGE UP /HPF (ref 0–4)
SODIUM SERPL-SCNC: 143 MMOL/L — SIGNIFICANT CHANGE UP (ref 135–145)
SP GR SPEC: 1.02 — SIGNIFICANT CHANGE UP (ref 1.01–1.02)
T3 SERPL-MCNC: 106 NG/DL — SIGNIFICANT CHANGE UP (ref 80–200)
T4 AB SER-ACNC: 6.3 UG/DL — SIGNIFICANT CHANGE UP (ref 4.5–12)
TSH SERPL-MCNC: 0.18 UIU/ML — LOW (ref 0.27–4.2)
UROBILINOGEN FLD QL: NEGATIVE MG/DL — SIGNIFICANT CHANGE UP
WBC # BLD: 6.45 K/UL — SIGNIFICANT CHANGE UP (ref 3.8–10.5)
WBC # FLD AUTO: 6.45 K/UL — SIGNIFICANT CHANGE UP (ref 3.8–10.5)
WBC UR QL: SIGNIFICANT CHANGE UP /HPF (ref 0–5)

## 2023-01-17 PROCEDURE — 93010 ELECTROCARDIOGRAM REPORT: CPT

## 2023-01-17 PROCEDURE — G0463: CPT

## 2023-01-17 PROCEDURE — 93005 ELECTROCARDIOGRAM TRACING: CPT

## 2023-01-17 RX ORDER — CEFAZOLIN SODIUM 1 G
2000 VIAL (EA) INJECTION ONCE
Refills: 0 | Status: DISCONTINUED | OUTPATIENT
Start: 2023-02-03 | End: 2023-02-03

## 2023-01-17 NOTE — H&P PST ADULT - GASTROINTESTINAL
normal/soft/nontender/nondistended/normal active bowel sounds/no guarding/no rigidity/no organomegaly/no palpable dina/no masses palpable negative

## 2023-01-17 NOTE — H&P PST ADULT - CARDIOVASCULAR
normal/regular rate and rhythm/S1 S2 present/no gallops/no rub/no murmur/no JVD/normal PMI/no pedal edema/vascular details…

## 2023-01-17 NOTE — H&P PST ADULT - ATTENDING COMMENTS
Patient seen in presurgical holding area with her cousin Gissel present for the informed consent discussion.  Discussed benefits of using the robotic assisted platform for her operation in the event staging is necessary based upon the frozen section pathologic diagnosis.  Ms. Diaz is in agreement with the robotic-assisted LSC approach.  Plan for RSO/L salpingectomy/HSC w/ D&C, possible total hysterectomy, staging, cystoscopy.  R/B/A were discussed and written consent is signed & witnessed in the chart.

## 2023-01-17 NOTE — H&P PST ADULT - NSANTHOSAYNRD_GEN_A_CORE
impaired motor control/impaired balance No. GOKUL screening performed.  STOP BANG Legend: 0-2 = LOW Risk; 3-4 = INTERMEDIATE Risk; 5-8 = HIGH Risk

## 2023-01-17 NOTE — H&P PST ADULT - NSHP PST DIAGOTHER LIST_GEN_A_CORE
1/17/23 19:00 All available labs noted as documented, all abnormal labs noted as documented and have been faxed to PCP with whom medical clearance is pending, UA Emailed to Shaista Chase of surgeon, CMP, thyroid panel, A1C, mg, phos and urine culture pending. Helena DA SILVA, FNP-BC 1/17/23 19:00 All available labs noted as documented, all abnormal labs noted as documented and have been faxed to PCP with whom medical clearance is pending, UA Emailed to Shaista Chase of surgeon, CMP, thyroid panel, A1C, mg, phos and urine culture pending. Helena MS, FNP-BC 1/17/23 19:18 All available labs noted as documented, all abnormal labs noted as documented and have been faxed to PCP with whom medical clearance is pending, Urine culture pending. Helena MS, FNP-BC 1/17/23 19:00 All available labs noted as documented, all abnormal labs noted as documented and have been faxed to PCP with whom medical clearance is pending, Arelis Luke MS, FNP-BC

## 2023-01-17 NOTE — H&P PST ADULT - NEUROLOGICAL
normal/cranial nerves II-XII intact/sensation intact/cranial nerves intact/no spontaneous movement/superficial reflexes intact negative

## 2023-01-17 NOTE — H&P PST ADULT - NSICDXPASTMEDICALHX_GEN_ALL_CORE_FT
PAST MEDICAL HISTORY:  2019 novel coronavirus disease (COVID-19)     Abnormal findings on diagnostic imaging of body structures     COVID-19 vaccine series completed     Hyperthyroidism resolved? no meds    Other specified conditions associated with female genital organs and menstrual cycle      PAST MEDICAL HISTORY:  2019 novel coronavirus disease (COVID-19)     Abnormal findings on diagnostic imaging of body structures     COVID-19 vaccine series completed     H/O: pituitary tumor     Hyperthyroidism resolved? no meds    Obesity     Other specified conditions associated with female genital organs and menstrual cycle

## 2023-01-17 NOTE — H&P PST ADULT - OTHER CARE PROVIDERS
Endocrine Dr. Russo, PCP Dr. Leon [phone] Endocrine Dr. Russo [phone], PCP Dr. Leon [phone], [phone]

## 2023-01-17 NOTE — H&P PST ADULT - HISTORY OF PRESENT ILLNESS
51 y/o femPt. states 10/1/22 she had severe right upper abdominal pain that brought her to the ER, she was admitted, she was seen by surgery, pt. was told she has "omental infarction" and gallstones, pt. was referred to provider who advised she had an inflamed right colon, pt. then repeated a CT scan that was stable, and subsequently on this imaging a right ovarian mass was found, she was referred to GYN who then referred to Dr. Cooper who recommended aforementioned. Pt. states denies pain, states pain resolved. Denies bleeding, n/v/d/c. Pt. with history of hyperthyroidism, obesity.  53 y/o female presents today to [facility] pending pelvic exam under anesthesia D&C lap. BSO right oophorectomy possible left oophorectomy possible hysterectomy possible staging possible cystoscopy possible laparotomy on 2/3/23 secondary to abnormal findings on diagnostic imaging and other conditions associated with female genital organs and menstrual cycle with Dr. Linnette Cooper. Pt. states 10/1/22 she had severe right upper abdominal pain that brought her to the ER, she was admitted, she was seen by surgery, pt. was told she has "omental infarction" and gallstones, pt. was referred to provider who advised she had an inflamed right colon, pt. then repeated a CT scan that was stable, and subsequently on this imaging a right ovarian mass was found, she was referred to GYN who then referred to Dr. Cooper who recommended aforementioned. Pt. states denies pain, states pain resolved. Denies bleeding, n/v/d/c. Pt. with history of hyperthyroidism, obesity.

## 2023-01-17 NOTE — H&P PST ADULT - ASSESSMENT
51 y/o female presents today to PST pending pelvic exam under anesthesia D&C lap. BSO right oophorectomy possible left oophorectomy possible hysterectomy possible staging possible cystoscopy possible laparotomy on 2/3/23 secondary to abnormal findings on diagnostic imaging and other conditions associated with female genital organs and menstrual cycle with Dr. Linnette Cooper. Pt. states 10/1/22 she had severe right upper abdominal pain that brought her to the ER, she was admitted, she was seen by surgery, pt. was told she has "omental infarction" and gallstones, pt. was referred to provider who advised she had an inflamed right colon, pt. then repeated a CT scan that was stable, and subsequently on this imaging a right ovarian mass was found, she was referred to GYN who then referred to Dr. Cooper who recommended aforementioned. Pt. states denies pain, states pain resolved. Denies bleeding, n/v/d/c. Pt. with history of hyperthyroidism, obesity.  BMI 34.8.    Pt. states she went for GI preop. colonoscopy as per surgeon, office to be contacted to obtain results, pt. verbalized agreement and understanding.  Pt. to have medical clearance with Dr. Leon PCP, pt. is aware she requires medical optimization appt. Pt. verbalized agreement and understanding.   Pt. educated and instructed regarding all preoperative instructions and education as per policy via both verbal and written means of communication and pt. verbalized agreement and understanding.  Patient educated on surgical scrub, COVID testing-pt. states she has arrangements for preop. COVID PCR per surgeon and policy, preadmission instructions, medical clearance and day of procedure medications as per policy and pt. verbalizes understanding and agreement.  Pt instructed to stop vitamins/supplements/herbal medications/NSAIDS for one week prior to surgery and  pt. verbalized agreement and understanding.    OPIOID RISK TOOL    JOELLE EACH BOX THAT APPLIES AND ADD TOTALS AT THE END    FAMILY HISTORY OF SUBSTANCE ABUSE                 FEMALE         MALE                                                Alcohol                             [  ]1 pt          [  ]3pts                                               Illegal Durgs                     [  ]2 pts        [  ]3pts                                               Rx Drugs                           [  ]4 pts        [  ]4 pts    PERSONAL HISTORY OF SUBSTANCE ABUSE                                                                                          Alcohol                             [  ]3 pts       [  ]3 pts                                               Illegal Drugs                     [  ]4 pts        [  ]4 pts                                               Rx Drugs                           [  ]5 pts        [  ]5 pts    AGE BETWEEN 16-45 YEARS                                      [  ]1 pt         [  ]1 pt    HISTORY OF PREADOLESCENT   SEXUAL ABUSE                                                             [  ]3 pts        [  ]0pts    PSYCHOLOGICAL DISEASE                     ADD, OCD, Bipolar, Schizophrenia        [  ]2 pts         [  ]2 pts                      Depression                                               [  ]1 pt           [  ]1 pt           SCORING TOTAL   (add numbers and type here)              (0)                                     A score of 3 or lower indicated LOW risk for future opioid abuse  A score of 4 to 7 indicated moderate risk for future opioid abuse  A score of 8 or higher indicates a high risk for opioid abuse    CAPRINI SCORE    AGE RELATED RISK FACTORS                                                             [x ] Age 41-60 years                                            (1 Point)  [ ] Age: 61-74 years                                           (2 Points)                 [ ] Age= 75 years                                                (3 Points)             DISEASE RELATED RISK FACTORS                                                       [ ] Edema in the lower extremities                 (1 Point)                     [ ] Varicose veins                                               (1 Point)                                 [ x] BMI > 25 Kg/m2                                            (1 Point)                                  [ ] Serious infection (ie PNA)                            (1 Point)                     [ ] Lung disease ( COPD, Emphysema)            (1 Point)                                                                          [ ] Acute myocardial infarction                         (1 Point)                  [ ] Congestive heart failure (in the previous month)  (1 Point)         [ ] Inflammatory bowel disease                            (1 Point)                  [ ] Central venous access, PICC or Port               (2 points)       (within the last month)                                                                [ ] Stroke (in the previous month)                        (5 Points)    [ ] Previous or present malignancy                       (2 points)                                                                                                                                                         HEMATOLOGY RELATED FACTORS                                                         [ ] Prior episodes of VTE                                     (3 Points)                     [ ] Positive family history for VTE                      (3 Points)                  [ ] Prothrombin 10990 A                                     (3 Points)                     [ ] Factor V Leiden                                                (3 Points)                        [ ] Lupus anticoagulants                                      (3 Points)                                                           [ ] Anticardiolipin antibodies                              (3 Points)                                                       [ ] High homocysteine in the blood                   (3 Points)                                             [ ] Other congenital or acquired thrombophilia      (3 Points)                                                [ ] Heparin induced thrombocytopenia                  (3 Points)                                        MOBILITY RELATED FACTORS  [ ] Bed rest                                                         (1 Point)  [ ] Plaster cast                                                    (2 points)  [ ] Bed bound for more than 72 hours           (2 Points)    GENDER SPECIFIC FACTORS  [ ] Pregnancy or had a baby within the last month   (1 Point)  [ ] Post-partum < 6 weeks                                   (1 Point)  [ ] Hormonal therapy  or oral contraception   (1 Point)  [ ] History of pregnancy complications              (1 point)  [ ] Unexplained or recurrent              (1 Point)    OTHER RISK FACTORS                                           (1 Point)  [x ] BMI >40, smoking, diabetes requiring insulin, chemotherapy  blood transfusions and length of surgery over 2 hours    SURGERY RELATED RISK FACTORS  [ ]  Section within the last month     (1 Point)  [ ] Minor surgery                                                  (1 Point)  [ ] Arthroscopic surgery                                       (2 Points)  [x ] Planned major surgery lasting more            (2 Points)      than 45 minutes     [ ] Elective hip or knee joint replacement       (5 points)       surgery                                                TRAUMA RELATED RISK FACTORS  [ ] Fracture of the hip, pelvis, or leg                       (5 Points)  [ ] Spinal cord injury resulting in paralysis             (5 points)       (in the previous month)    [ ] Paralysis  (less than 1 month)                             (5 Points)  [ ] Multiple Trauma within 1 month                        (5 Points)    Total Score [    5    ]    Caprini Score 0-2: Low Risk, NO VTE prophylaxis required for most patients, encourage ambulation  Caprini Score 3-6: Moderate Risk , pharmacologic VTE prophylaxis is indicated for most patients (in the absence of contraindications)  Caprini Score Greater than or =7: High risk, pharmocologic VTE prophylaxis indicated for most patients (in the absence of contraindications)                                         51 y/o female presents today to [facility] pending pelvic exam under anesthesia D&C lap. BSO right oophorectomy possible left oophorectomy possible hysterectomy possible staging possible cystoscopy possible laparotomy on 2/3/23 secondary to abnormal findings on diagnostic imaging and other conditions associated with female genital organs and menstrual cycle with Dr. Linnette Cooper. Pt. states 10/1/22 she had severe right upper abdominal pain that brought her to the ER, she was admitted, she was seen by surgery, pt. was told she has "omental infarction" and gallstones, pt. was referred to provider who advised she had an inflamed right colon, pt. then repeated a CT scan that was stable, and subsequently on this imaging a right ovarian mass was found, she was referred to GYN who then referred to Dr. Cooper who recommended aforementioned. Pt. states denies pain, states pain resolved. Denies bleeding, n/v/d/c. Pt. with history of hyperthyroidism, obesity.  BMI 34.8.    Pt. states she went for GI preop. colonoscopy as per surgeon, office to be contacted to obtain results, pt. verbalized agreement and understanding.  Pt. to have medical clearance with Dr. Leon PCP, pt. is aware she requires medical optimization appt. Pt. verbalized agreement and understanding.   Pt. educated and instructed regarding all preoperative instructions and education as per policy via both verbal and written means of communication and pt. verbalized agreement and understanding.  Patient educated on surgical scrub, COVID testing-pt. states she has arrangements for preop. COVID PCR per surgeon and policy, preadmission instructions, medical clearance and day of procedure medications as per policy and pt. verbalizes understanding and agreement.  Pt instructed to stop vitamins/supplements/herbal medications/NSAIDS for one week prior to surgery and  pt. verbalized agreement and understanding.    23 19:24 UA and TSH as documented Emailed to Shaista Chase of surgeon and faxed to PCP with whom medical clearance is pending. All available labs noted as documented, all abnormal labs noted as documented and have been faxed to PCP with whom medical clearance is pending, Urine culture pending. Helena MS, FNP-BC   OPIOID RISK TOOL    JOELLE EACH BOX THAT APPLIES AND ADD TOTALS AT THE END    FAMILY HISTORY OF SUBSTANCE ABUSE                 FEMALE         MALE                                                Alcohol                             [  ]1 pt          [  ]3pts                                               Illegal Durgs                     [  ]2 pts        [  ]3pts                                               Rx Drugs                           [  ]4 pts        [  ]4 pts    PERSONAL HISTORY OF SUBSTANCE ABUSE                                                                                          Alcohol                             [  ]3 pts       [  ]3 pts                                               Illegal Drugs                     [  ]4 pts        [  ]4 pts                                               Rx Drugs                           [  ]5 pts        [  ]5 pts    AGE BETWEEN 16-45 YEARS                                      [  ]1 pt         [  ]1 pt    HISTORY OF PREADOLESCENT   SEXUAL ABUSE                                                             [  ]3 pts        [  ]0pts    PSYCHOLOGICAL DISEASE                     ADD, OCD, Bipolar, Schizophrenia        [  ]2 pts         [  ]2 pts                      Depression                                               [  ]1 pt           [  ]1 pt           SCORING TOTAL   (add numbers and type here)              (0)                                     A score of 3 or lower indicated LOW risk for future opioid abuse  A score of 4 to 7 indicated moderate risk for future opioid abuse  A score of 8 or higher indicates a high risk for opioid abuse    CAPRINI SCORE    AGE RELATED RISK FACTORS                                                             [x ] Age 41-60 years                                            (1 Point)  [ ] Age: 61-74 years                                           (2 Points)                 [ ] Age= 75 years                                                (3 Points)             DISEASE RELATED RISK FACTORS                                                       [ ] Edema in the lower extremities                 (1 Point)                     [ ] Varicose veins                                               (1 Point)                                 [ x] BMI > 25 Kg/m2                                            (1 Point)                                  [ ] Serious infection (ie PNA)                            (1 Point)                     [ ] Lung disease ( COPD, Emphysema)            (1 Point)                                                                          [ ] Acute myocardial infarction                         (1 Point)                  [ ] Congestive heart failure (in the previous month)  (1 Point)         [ ] Inflammatory bowel disease                            (1 Point)                  [ ] Central venous access, PICC or Port               (2 points)       (within the last month)                                                                [ ] Stroke (in the previous month)                        (5 Points)    [ ] Previous or present malignancy                       (2 points)                                                                                                                                                         HEMATOLOGY RELATED FACTORS                                                         [ ] Prior episodes of VTE                                     (3 Points)                     [ ] Positive family history for VTE                      (3 Points)                  [ ] Prothrombin 89924 A                                     (3 Points)                     [ ] Factor V Leiden                                                (3 Points)                        [ ] Lupus anticoagulants                                      (3 Points)                                                           [ ] Anticardiolipin antibodies                              (3 Points)                                                       [ ] High homocysteine in the blood                   (3 Points)                                             [ ] Other congenital or acquired thrombophilia      (3 Points)                                                [ ] Heparin induced thrombocytopenia                  (3 Points)                                        MOBILITY RELATED FACTORS  [ ] Bed rest                                                         (1 Point)  [ ] Plaster cast                                                    (2 points)  [ ] Bed bound for more than 72 hours           (2 Points)    GENDER SPECIFIC FACTORS  [ ] Pregnancy or had a baby within the last month   (1 Point)  [ ] Post-partum < 6 weeks                                   (1 Point)  [ ] Hormonal therapy  or oral contraception   (1 Point)  [ ] History of pregnancy complications              (1 point)  [ ] Unexplained or recurrent              (1 Point)    OTHER RISK FACTORS                                           (1 Point)  [x ] BMI >40, smoking, diabetes requiring insulin, chemotherapy  blood transfusions and length of surgery over 2 hours    SURGERY RELATED RISK FACTORS  [ ]  Section within the last month     (1 Point)  [ ] Minor surgery                                                  (1 Point)  [ ] Arthroscopic surgery                                       (2 Points)  [x ] Planned major surgery lasting more            (2 Points)      than 45 minutes     [ ] Elective hip or knee joint replacement       (5 points)       surgery                                                TRAUMA RELATED RISK FACTORS  [ ] Fracture of the hip, pelvis, or leg                       (5 Points)  [ ] Spinal cord injury resulting in paralysis             (5 points)       (in the previous month)    [ ] Paralysis  (less than 1 month)                             (5 Points)  [ ] Multiple Trauma within 1 month                        (5 Points)    Total Score [    5    ]    Caprini Score 0-2: Low Risk, NO VTE prophylaxis required for most patients, encourage ambulation  Caprini Score 3-6: Moderate Risk , pharmacologic VTE prophylaxis is indicated for most patients (in the absence of contraindications)  Caprini Score Greater than or =7: High risk, pharmocologic VTE prophylaxis indicated for most patients (in the absence of contraindications)                                         53 y/o female presents today to [facility] pending pelvic exam under anesthesia D&C lap. BSO right oophorectomy possible left oophorectomy possible hysterectomy possible staging possible cystoscopy possible laparotomy on 2/3/23 secondary to abnormal findings on diagnostic imaging and other conditions associated with female genital organs and menstrual cycle with Dr. Linnette Cooper. Pt. states 10/1/22 she had severe right upper abdominal pain that brought her to the ER, she was admitted, she was seen by surgery, pt. was told she has "omental infarction" and gallstones, pt. was referred to provider who advised she had an inflamed right colon, pt. then repeated a CT scan that was stable, and subsequently on this imaging a right ovarian mass was found, she was referred to GYN who then referred to Dr. Cooper who recommended aforementioned. Pt. states denies pain, states pain resolved. Denies bleeding, n/v/d/c. Pt. with history of hyperthyroidism, obesity.  BMI 34.8.    Pt. states she went for GI preop. colonoscopy as per surgeon, office to be contacted to obtain results, pt. verbalized agreement and understanding.  Pt. to have medical clearance with Dr. Leon PCP, pt. is aware she requires medical optimization appt. Pt. verbalized agreement and understanding.   Pt. educated and instructed regarding all preoperative instructions and education as per policy via both verbal and written means of communication and pt. verbalized agreement and understanding.  Patient educated on surgical scrub, COVID testing-pt. states she has arrangements for preop. COVID PCR per surgeon and policy, preadmission instructions, medical clearance and day of procedure medications as per policy and pt. verbalizes understanding and agreement.  Pt instructed to stop vitamins/supplements/herbal medications/NSAIDS for one week prior to surgery and  pt. verbalized agreement and understanding.    23 19:24 UA and TSH as documented Emailed to Shaista Chase of surgeon and faxed to PCP with whom medical clearance is pending. All available labs noted as documented, all abnormal labs noted as documented and have been faxed to PCP with whom medical clearance is pending, Urine culture pending. Helena MS, FNP-BC   23 10:19 Call placed to PCP Dr. Leon requesting call back to report abnormal TSH via phone. [facility] phone number provided for call back. Helena MS, FN-BC   OPIOID RISK TOOL    JOELLE EACH BOX THAT APPLIES AND ADD TOTALS AT THE END    FAMILY HISTORY OF SUBSTANCE ABUSE                 FEMALE         MALE                                                Alcohol                             [  ]1 pt          [  ]3pts                                               Illegal Durgs                     [  ]2 pts        [  ]3pts                                               Rx Drugs                           [  ]4 pts        [  ]4 pts    PERSONAL HISTORY OF SUBSTANCE ABUSE                                                                                          Alcohol                             [  ]3 pts       [  ]3 pts                                               Illegal Drugs                     [  ]4 pts        [  ]4 pts                                               Rx Drugs                           [  ]5 pts        [  ]5 pts    AGE BETWEEN 16-45 YEARS                                      [  ]1 pt         [  ]1 pt    HISTORY OF PREADOLESCENT   SEXUAL ABUSE                                                             [  ]3 pts        [  ]0pts    PSYCHOLOGICAL DISEASE                     ADD, OCD, Bipolar, Schizophrenia        [  ]2 pts         [  ]2 pts                      Depression                                               [  ]1 pt           [  ]1 pt           SCORING TOTAL   (add numbers and type here)              (0)                                     A score of 3 or lower indicated LOW risk for future opioid abuse  A score of 4 to 7 indicated moderate risk for future opioid abuse  A score of 8 or higher indicates a high risk for opioid abuse    CAPRINI SCORE    AGE RELATED RISK FACTORS                                                             [x ] Age 41-60 years                                            (1 Point)  [ ] Age: 61-74 years                                           (2 Points)                 [ ] Age= 75 years                                                (3 Points)             DISEASE RELATED RISK FACTORS                                                       [ ] Edema in the lower extremities                 (1 Point)                     [ ] Varicose veins                                               (1 Point)                                 [ x] BMI > 25 Kg/m2                                            (1 Point)                                  [ ] Serious infection (ie PNA)                            (1 Point)                     [ ] Lung disease ( COPD, Emphysema)            (1 Point)                                                                          [ ] Acute myocardial infarction                         (1 Point)                  [ ] Congestive heart failure (in the previous month)  (1 Point)         [ ] Inflammatory bowel disease                            (1 Point)                  [ ] Central venous access, PICC or Port               (2 points)       (within the last month)                                                                [ ] Stroke (in the previous month)                        (5 Points)    [ ] Previous or present malignancy                       (2 points)                                                                                                                                                         HEMATOLOGY RELATED FACTORS                                                         [ ] Prior episodes of VTE                                     (3 Points)                     [ ] Positive family history for VTE                      (3 Points)                  [ ] Prothrombin 53381 A                                     (3 Points)                     [ ] Factor V Leiden                                                (3 Points)                        [ ] Lupus anticoagulants                                      (3 Points)                                                           [ ] Anticardiolipin antibodies                              (3 Points)                                                       [ ] High homocysteine in the blood                   (3 Points)                                             [ ] Other congenital or acquired thrombophilia      (3 Points)                                                [ ] Heparin induced thrombocytopenia                  (3 Points)                                        MOBILITY RELATED FACTORS  [ ] Bed rest                                                         (1 Point)  [ ] Plaster cast                                                    (2 points)  [ ] Bed bound for more than 72 hours           (2 Points)    GENDER SPECIFIC FACTORS  [ ] Pregnancy or had a baby within the last month   (1 Point)  [ ] Post-partum < 6 weeks                                   (1 Point)  [ ] Hormonal therapy  or oral contraception   (1 Point)  [ ] History of pregnancy complications              (1 point)  [ ] Unexplained or recurrent              (1 Point)    OTHER RISK FACTORS                                           (1 Point)  [x ] BMI >40, smoking, diabetes requiring insulin, chemotherapy  blood transfusions and length of surgery over 2 hours    SURGERY RELATED RISK FACTORS  [ ]  Section within the last month     (1 Point)  [ ] Minor surgery                                                  (1 Point)  [ ] Arthroscopic surgery                                       (2 Points)  [x ] Planned major surgery lasting more            (2 Points)      than 45 minutes     [ ] Elective hip or knee joint replacement       (5 points)       surgery                                                TRAUMA RELATED RISK FACTORS  [ ] Fracture of the hip, pelvis, or leg                       (5 Points)  [ ] Spinal cord injury resulting in paralysis             (5 points)       (in the previous month)    [ ] Paralysis  (less than 1 month)                             (5 Points)  [ ] Multiple Trauma within 1 month                        (5 Points)    Total Score [    5    ]    Caprini Score 0-2: Low Risk, NO VTE prophylaxis required for most patients, encourage ambulation  Caprini Score 3-6: Moderate Risk , pharmacologic VTE prophylaxis is indicated for most patients (in the absence of contraindications)  Caprini Score Greater than or =7: High risk, pharmocologic VTE prophylaxis indicated for most patients (in the absence of contraindications)                                         51 y/o female presents today to [facility] pending pelvic exam under anesthesia D&C lap. BSO right oophorectomy possible left oophorectomy possible hysterectomy possible staging possible cystoscopy possible laparotomy on 2/3/23 secondary to abnormal findings on diagnostic imaging and other conditions associated with female genital organs and menstrual cycle with Dr. Linnette Cooper. Pt. states 10/1/22 she had severe right upper abdominal pain that brought her to the ER, she was admitted, she was seen by surgery, pt. was told she has "omental infarction" and gallstones, pt. was referred to provider who advised she had an inflamed right colon, pt. then repeated a CT scan that was stable, and subsequently on this imaging a right ovarian mass was found, she was referred to GYN who then referred to Dr. Cooper who recommended aforementioned. Pt. states denies pain, states pain resolved. Denies bleeding, n/v/d/c. Pt. with history of hyperthyroidism, obesity.  BMI 34.8.    Pt. states she went for GI preop. colonoscopy as per surgeon, office to be contacted to obtain results, pt. verbalized agreement and understanding.  Pt. to have medical clearance with Dr. Leon PCP, pt. is aware she requires medical optimization appt. Pt. verbalized agreement and understanding.   Pt. educated and instructed regarding all preoperative instructions and education as per policy via both verbal and written means of communication and pt. verbalized agreement and understanding.  Patient educated on surgical scrub, COVID testing-pt. states she has arrangements for preop. COVID PCR per surgeon and policy, preadmission instructions, medical clearance and day of procedure medications as per policy and pt. verbalizes understanding and agreement.  Pt instructed to stop vitamins/supplements/herbal medications/NSAIDS for one week prior to surgery and  pt. verbalized agreement and understanding.    23 19:24 UA and TSH as documented Emailed to Shaista Chase of surgeon and faxed to PCP with whom medical clearance is pending. All available labs noted as documented, all abnormal labs noted as documented and have been faxed to PCP with whom medical clearance is pending, Urine culture pending. [place] MS, FNP-BC   23 10:19 Call placed to PCP Dr. Leon requesting call back to report abnormal TSH via phone. [facility] phone number provided for call back. Helena MS, FNP-BC   23 12:09 Call back received from PCP Dr. Leon, TSH as documented reported to PCP and has been faxed to office with whom medical optimization is pending, PCP states pt. recently restarted on tapazole 1/3/23 and that TSH has been abnormal with endocrine recently as well. Case discussed with Anesthesia Dr. Krish penaloza. TSH result and case as documented, conversation entailed OK to proceed with medical optimization preop. and will obtain last note from Dr. Louis Jordan, endocrine. Helena DA SILVA, [facility]     OPIOID RISK TOOL    JOELLE EACH BOX THAT APPLIES AND ADD TOTALS AT THE END    FAMILY HISTORY OF SUBSTANCE ABUSE                 FEMALE         MALE                                                Alcohol                             [  ]1 pt          [  ]3pts                                               Illegal Durgs                     [  ]2 pts        [  ]3pts                                               Rx Drugs                           [  ]4 pts        [  ]4 pts    PERSONAL HISTORY OF SUBSTANCE ABUSE                                                                                          Alcohol                             [  ]3 pts       [  ]3 pts                                               Illegal Drugs                     [  ]4 pts        [  ]4 pts                                               Rx Drugs                           [  ]5 pts        [  ]5 pts    AGE BETWEEN 16-45 YEARS                                      [  ]1 pt         [  ]1 pt    HISTORY OF PREADOLESCENT   SEXUAL ABUSE                                                             [  ]3 pts        [  ]0pts    PSYCHOLOGICAL DISEASE                     ADD, OCD, Bipolar, Schizophrenia        [  ]2 pts         [  ]2 pts                      Depression                                               [  ]1 pt           [  ]1 pt           SCORING TOTAL   (add numbers and type here)              (0)                                     A score of 3 or lower indicated LOW risk for future opioid abuse  A score of 4 to 7 indicated moderate risk for future opioid abuse  A score of 8 or higher indicates a high risk for opioid abuse    CAPRINI SCORE    AGE RELATED RISK FACTORS                                                             [x ] Age 41-60 years                                            (1 Point)  [ ] Age: 61-74 years                                           (2 Points)                 [ ] Age= 75 years                                                (3 Points)             DISEASE RELATED RISK FACTORS                                                       [ ] Edema in the lower extremities                 (1 Point)                     [ ] Varicose veins                                               (1 Point)                                 [ x] BMI > 25 Kg/m2                                            (1 Point)                                  [ ] Serious infection (ie PNA)                            (1 Point)                     [ ] Lung disease ( COPD, Emphysema)            (1 Point)                                                                          [ ] Acute myocardial infarction                         (1 Point)                  [ ] Congestive heart failure (in the previous month)  (1 Point)         [ ] Inflammatory bowel disease                            (1 Point)                  [ ] Central venous access, PICC or Port               (2 points)       (within the last month)                                                                [ ] Stroke (in the previous month)                        (5 Points)    [ ] Previous or present malignancy                       (2 points)                                                                                                                                                         HEMATOLOGY RELATED FACTORS                                                         [ ] Prior episodes of VTE                                     (3 Points)                     [ ] Positive family history for VTE                      (3 Points)                  [ ] Prothrombin 60377 A                                     (3 Points)                     [ ] Factor V Leiden                                                (3 Points)                        [ ] Lupus anticoagulants                                      (3 Points)                                                           [ ] Anticardiolipin antibodies                              (3 Points)                                                       [ ] High homocysteine in the blood                   (3 Points)                                             [ ] Other congenital or acquired thrombophilia      (3 Points)                                                [ ] Heparin induced thrombocytopenia                  (3 Points)                                        MOBILITY RELATED FACTORS  [ ] Bed rest                                                         (1 Point)  [ ] Plaster cast                                                    (2 points)  [ ] Bed bound for more than 72 hours           (2 Points)    GENDER SPECIFIC FACTORS  [ ] Pregnancy or had a baby within the last month   (1 Point)  [ ] Post-partum < 6 weeks                                   (1 Point)  [ ] Hormonal therapy  or oral contraception   (1 Point)  [ ] History of pregnancy complications              (1 point)  [ ] Unexplained or recurrent              (1 Point)    OTHER RISK FACTORS                                           (1 Point)  [x ] BMI >40, smoking, diabetes requiring insulin, chemotherapy  blood transfusions and length of surgery over 2 hours    SURGERY RELATED RISK FACTORS  [ ]  Section within the last month     (1 Point)  [ ] Minor surgery                                                  (1 Point)  [ ] Arthroscopic surgery                                       (2 Points)  [x ] Planned major surgery lasting more            (2 Points)      than 45 minutes     [ ] Elective hip or knee joint replacement       (5 points)       surgery                                                TRAUMA RELATED RISK FACTORS  [ ] Fracture of the hip, pelvis, or leg                       (5 Points)  [ ] Spinal cord injury resulting in paralysis             (5 points)       (in the previous month)    [ ] Paralysis  (less than 1 month)                             (5 Points)  [ ] Multiple Trauma within 1 month                        (5 Points)    Total Score [    5    ]    Caprini Score 0-2: Low Risk, NO VTE prophylaxis required for most patients, encourage ambulation  Caprini Score 3-6: Moderate Risk , pharmacologic VTE prophylaxis is indicated for most patients (in the absence of contraindications)  Caprini Score Greater than or =7: High risk, pharmocologic VTE prophylaxis indicated for most patients (in the absence of contraindications)

## 2023-01-17 NOTE — H&P PST ADULT - PROBLEM SELECTOR PLAN 3
Medical optimization pending for pelvic exam under anesthesia D&C lap. BSO right oophorectomy possible left oophorectomy possible hysterectomy possible staging possible cystoscopy possible laparotomy on 2/3/23 secondary to abnormal findings on diagnostic imaging and other conditions associated with female genital organs and menstrual cycle with Dr. Linnette Cooper.

## 2023-01-17 NOTE — H&P PST ADULT - BP NONINVASIVE MEAN (MM HG)
· Med Name & Dose: Zoloft 100 mg  · Last refill was 03/12/2018 Number of Refills sent: 1  · Quantity of medication given 30 day supply  · Last visit for that condition 12/08/2018  · Date of next appt: Visit date not found  · Date of any Lab results pertaining to medication: None        May I refill?    
[de-identified] : Patient is an 82 year old woman with a history of HTN, hyperlipidemia, type 2 diabetes mellitus, breast cancer status post right partial mastectomy, rectal carcinoma status post resection, anemia, hypokalemia, diastolic heart failure, right lower extremity DVT, CAD s/p 2 vessel CABG 11/9/2020 and renal insufficiency who presents today for follow up on chronic problems Her daughter states that she has been less active.  She has gained weight and since her last visit . She is not compliant with home glucose monitoring and Lantus intake   She has been taking Bumex 1 mg daily and twice a week she takes it twice daily. She otherwise denies any chest pain, palpitations,  dizziness. Her right upper extremity swelling due to chronic lymphedema unchanged 
80

## 2023-01-18 LAB
CULTURE RESULTS: SIGNIFICANT CHANGE UP
SPECIMEN SOURCE: SIGNIFICANT CHANGE UP

## 2023-01-19 PROBLEM — E66.9 OBESITY, UNSPECIFIED: Chronic | Status: ACTIVE | Noted: 2023-01-17

## 2023-01-19 PROBLEM — N94.89 OTHER SPECIFIED CONDITIONS ASSOCIATED WITH FEMALE GENITAL ORGANS AND MENSTRUAL CYCLE: Chronic | Status: ACTIVE | Noted: 2023-01-17

## 2023-01-19 PROBLEM — Z87.898 PERSONAL HISTORY OF OTHER SPECIFIED CONDITIONS: Chronic | Status: ACTIVE | Noted: 2023-01-17

## 2023-01-19 PROBLEM — R93.89 ABNORMAL FINDINGS ON DIAGNOSTIC IMAGING OF OTHER SPECIFIED BODY STRUCTURES: Chronic | Status: ACTIVE | Noted: 2023-01-17

## 2023-01-19 PROBLEM — Z92.29 PERSONAL HISTORY OF OTHER DRUG THERAPY: Chronic | Status: ACTIVE | Noted: 2023-01-17

## 2023-01-19 PROBLEM — U07.1 COVID-19: Chronic | Status: ACTIVE | Noted: 2023-01-17

## 2023-01-27 ENCOUNTER — APPOINTMENT (OUTPATIENT)
Dept: GYNECOLOGIC ONCOLOGY | Facility: CLINIC | Age: 53
End: 2023-01-27
Payer: COMMERCIAL

## 2023-01-27 DIAGNOSIS — R93.89 ABNORMAL FINDINGS ON DIAGNOSTIC IMAGING OF OTHER SPECIFIED BODY STRUCTURES: ICD-10-CM

## 2023-01-27 PROCEDURE — 99442: CPT

## 2023-01-30 PROBLEM — R93.89 THICKENED ENDOMETRIUM: Status: ACTIVE | Noted: 2022-12-09

## 2023-02-02 ENCOUNTER — TRANSCRIPTION ENCOUNTER (OUTPATIENT)
Age: 53
End: 2023-02-02

## 2023-02-02 NOTE — ASU PATIENT PROFILE, ADULT - NPO AFTER
Pt ambulatory to triage with mask in place. Reports being diagnosed with Gonorrhea and Chlamydia x2 weeks ago. Reports not being able to tolerate antx therapy that was prescribed. Reports breaking out in a skin rash when attempted antbx (doxycycline). Also complains of vaginal and anal itching. 00:00

## 2023-02-02 NOTE — ASU PATIENT PROFILE, ADULT - NSICDXPASTMEDICALHX_GEN_ALL_CORE_FT
PAST MEDICAL HISTORY:  2019 novel coronavirus disease (COVID-19)     Abnormal findings on diagnostic imaging of body structures     COVID-19 vaccine series completed     H/O: pituitary tumor     Hyperthyroidism resolved? no meds    Obesity     Other specified conditions associated with female genital organs and menstrual cycle

## 2023-02-03 ENCOUNTER — RESULT REVIEW (OUTPATIENT)
Age: 53
End: 2023-02-03

## 2023-02-03 ENCOUNTER — TRANSCRIPTION ENCOUNTER (OUTPATIENT)
Age: 53
End: 2023-02-03

## 2023-02-03 ENCOUNTER — OUTPATIENT (OUTPATIENT)
Dept: OUTPATIENT SERVICES | Facility: HOSPITAL | Age: 53
LOS: 1 days | End: 2023-02-03
Payer: COMMERCIAL

## 2023-02-03 VITALS
SYSTOLIC BLOOD PRESSURE: 130 MMHG | TEMPERATURE: 98 F | HEART RATE: 77 BPM | OXYGEN SATURATION: 100 % | RESPIRATION RATE: 16 BRPM | HEIGHT: 66 IN | WEIGHT: 215.39 LBS | DIASTOLIC BLOOD PRESSURE: 86 MMHG

## 2023-02-03 DIAGNOSIS — Z87.81 PERSONAL HISTORY OF (HEALED) TRAUMATIC FRACTURE: Chronic | ICD-10-CM

## 2023-02-03 DIAGNOSIS — N94.89 OTHER SPECIFIED CONDITIONS ASSOCIATED WITH FEMALE GENITAL ORGANS AND MENSTRUAL CYCLE: ICD-10-CM

## 2023-02-03 DIAGNOSIS — R93.89 ABNORMAL FINDINGS ON DIAGNOSTIC IMAGING OF OTHER SPECIFIED BODY STRUCTURES: ICD-10-CM

## 2023-02-03 PROCEDURE — 58661 LAPAROSCOPY REMOVE ADNEXA: CPT | Mod: 50

## 2023-02-03 PROCEDURE — 58545 LAPAROSCOPIC MYOMECTOMY: CPT

## 2023-02-03 PROCEDURE — 58558 HYSTEROSCOPY BIOPSY: CPT

## 2023-02-03 PROCEDURE — 88331 PATH CONSLTJ SURG 1 BLK 1SPC: CPT | Mod: 26

## 2023-02-03 PROCEDURE — 88305 TISSUE EXAM BY PATHOLOGIST: CPT | Mod: 26

## 2023-02-03 PROCEDURE — 88112 CYTOPATH CELL ENHANCE TECH: CPT | Mod: 26

## 2023-02-03 PROCEDURE — 88305 TISSUE EXAM BY PATHOLOGIST: CPT

## 2023-02-03 PROCEDURE — S2900: CPT

## 2023-02-03 PROCEDURE — 88112 CYTOPATH CELL ENHANCE TECH: CPT

## 2023-02-03 PROCEDURE — 36415 COLL VENOUS BLD VENIPUNCTURE: CPT

## 2023-02-03 PROCEDURE — 88331 PATH CONSLTJ SURG 1 BLK 1SPC: CPT

## 2023-02-03 PROCEDURE — 58661 LAPAROSCOPY REMOVE ADNEXA: CPT

## 2023-02-03 PROCEDURE — C1765: CPT

## 2023-02-03 DEVICE — INTERCEED 5 X 6" XL: Type: IMPLANTABLE DEVICE | Status: FUNCTIONAL

## 2023-02-03 RX ORDER — OXYCODONE HYDROCHLORIDE 5 MG/1
1 TABLET ORAL
Qty: 6 | Refills: 0
Start: 2023-02-03 | End: 2023-02-04

## 2023-02-03 RX ORDER — SODIUM CHLORIDE 9 MG/ML
1000 INJECTION, SOLUTION INTRAVENOUS
Refills: 0 | Status: DISCONTINUED | OUTPATIENT
Start: 2023-02-03 | End: 2023-02-04

## 2023-02-03 RX ORDER — ACETAMINOPHEN 500 MG
975 TABLET ORAL ONCE
Refills: 0 | Status: COMPLETED | OUTPATIENT
Start: 2023-02-03 | End: 2023-02-03

## 2023-02-03 RX ORDER — METHIMAZOLE 10 MG/1
0.5 TABLET ORAL
Qty: 0 | Refills: 0 | DISCHARGE

## 2023-02-03 RX ORDER — METRONIDAZOLE 500 MG
500 TABLET ORAL ONCE
Refills: 0 | Status: COMPLETED | OUTPATIENT
Start: 2023-02-03 | End: 2023-02-03

## 2023-02-03 RX ORDER — CELECOXIB 200 MG/1
400 CAPSULE ORAL ONCE
Refills: 0 | Status: COMPLETED | OUTPATIENT
Start: 2023-02-03 | End: 2023-02-03

## 2023-02-03 RX ORDER — SODIUM CHLORIDE 9 MG/ML
3 INJECTION INTRAMUSCULAR; INTRAVENOUS; SUBCUTANEOUS ONCE
Refills: 0 | Status: DISCONTINUED | OUTPATIENT
Start: 2023-02-03 | End: 2023-02-03

## 2023-02-03 RX ORDER — FENTANYL CITRATE 50 UG/ML
50 INJECTION INTRAVENOUS
Refills: 0 | Status: DISCONTINUED | OUTPATIENT
Start: 2023-02-03 | End: 2023-02-04

## 2023-02-03 RX ORDER — CEFAZOLIN SODIUM 1 G
2000 VIAL (EA) INJECTION ONCE
Refills: 0 | Status: COMPLETED | OUTPATIENT
Start: 2023-02-03 | End: 2023-02-03

## 2023-02-03 RX ORDER — ONDANSETRON 8 MG/1
4 TABLET, FILM COATED ORAL ONCE
Refills: 0 | Status: COMPLETED | OUTPATIENT
Start: 2023-02-03 | End: 2023-02-03

## 2023-02-03 RX ORDER — ACETAMINOPHEN 500 MG
1000 TABLET ORAL ONCE
Refills: 0 | Status: COMPLETED | OUTPATIENT
Start: 2023-02-03 | End: 2023-02-03

## 2023-02-03 RX ADMIN — Medication 200 MILLIGRAM(S): at 18:14

## 2023-02-03 RX ADMIN — ONDANSETRON 4 MILLIGRAM(S): 8 TABLET, FILM COATED ORAL at 21:00

## 2023-02-03 RX ADMIN — Medication 975 MILLIGRAM(S): at 16:31

## 2023-02-03 RX ADMIN — FENTANYL CITRATE 50 MICROGRAM(S): 50 INJECTION INTRAVENOUS at 21:45

## 2023-02-03 RX ADMIN — Medication 400 MILLIGRAM(S): at 23:40

## 2023-02-03 RX ADMIN — Medication 2000 MILLIGRAM(S): at 18:05

## 2023-02-03 RX ADMIN — CELECOXIB 400 MILLIGRAM(S): 200 CAPSULE ORAL at 16:32

## 2023-02-03 RX ADMIN — FENTANYL CITRATE 50 MICROGRAM(S): 50 INJECTION INTRAVENOUS at 21:37

## 2023-02-03 NOTE — HISTORY OF PRESENT ILLNESS
[FreeTextEntry1] : 50 yo  LMP 7 years ago referred by Dr Cass Fsih for adnexal mass and thickened endometrial lining. \par \par Patient had presented ot [facility] 10/1/22 with c/o severe RUQ pain, she had CT AP at that time revealing normal appendix, heterogeneous right adnexal mass with adjacent small amount of fluid. This mass cannot be well characterized on CT. Recommend further evaluation with pelvic ultrasound to rule out right ovarian torsion or other adnexal pathology. Fat stranding/edema adjacent to the right transverse colon without underlying colonic abnormality. Findings are nonspecific and differential diagnosis includes focal fat necrosis, focal omental necrosis or epiploic appendagitis. \par \par Follow up TVUS Heterogeneous mass in the posterior aspect of the right adnexa measuring 7.0 cm, posterior to the right ovary; difficult to determine if the mass is arising from the right ovary; a pre and post IV contrast MRI of the pelvis is recommended for further evaluation. No evidence of ovarian torsion at the time of the study, however if the mass is arising from the right ovary, intermittent torsion cannot be excluded; clinical correlation is recommended. Heterogeneous endometrium measuring 9 mm which is thickened for a postmenopausal patient; further evaluation is also recommended with MRI. She was told she had gallstones and omental infarction and was discharged home. \par \par She was sent to general surgeon Dr Novak by her PCP and was told her pain was likely due to R sided colonic inflammation, he sent her for follow up CT to evaluate adnexal mass. CT AP 22 with 6.1 cm heterogeneous right adnexal mass, no lymphadenopathy. Trace fat stranding anterior to the rightward aspect of the transverse colon, significantly improved from prior. Follow up MR pelvis recommended but not yet performed. \par \par Per patient, her pain has resolved completely. She has not had PMB and has a history of pituitary mass with elevated prolactin, managed by her endocrinologist Dr Jordan. \par \par Lpap: 2022 NIL, HPV negative (history of abnormal pap smear (LSIL HPV positive , EMILIANO 1 on colposcopic biopsy s/p LEEP 2021 with EMILIANO 1)\par Lmammo: overdue, has prescription\par Lcolonoscopy: never

## 2023-02-03 NOTE — BRIEF OPERATIVE NOTE - OPERATION/FINDINGS
Right 5cm pedunculated uterine mass along cornua/fundus. Grossly normal appearing fallopian tubes and ovaries bilaterally. Endometrial polyp noted on hysteroscopy.

## 2023-02-03 NOTE — BRIEF OPERATIVE NOTE - NSICDXBRIEFPROCEDURE_GEN_ALL_CORE_FT
PROCEDURES:  Robot-assisted salpingectomy 03-Feb-2023 20:53:11  Rimpel, Katherinne  Hysteroscopy with polypectomy and dilation of cervix with curettage procedure 03-Feb-2023 20:53:57  Rimpel, Katherinne  Excision, mass, uterine adnexa, laparoscopic 03-Feb-2023 20:54:37  Rimpel, Katherinne

## 2023-02-03 NOTE — ASU DISCHARGE PLAN (ADULT/PEDIATRIC) - NS MD DC FALL RISK RISK
For information on Fall & Injury Prevention, visit: [email or web address] OR  [email or web address] OR  https://www.cdc.gov/steadi/patient.html

## 2023-02-03 NOTE — ASU DISCHARGE PLAN (ADULT/PEDIATRIC) - CARE PROVIDER_API CALL
Linnette Cooper)  Gynecologic Oncology; Obstetrics and Gynecology  [address] [place] [address]  Phone: [phone]  Fax: [phone]  Follow Up Time:

## 2023-02-03 NOTE — END OF VISIT
[FreeTextEntry3] : Written by Blaire Parker PA-C, acting as a scribe for Dr. Linnette Cooper.\par This note accurately reflects the work and decisions made by me.\par

## 2023-02-03 NOTE — HISTORY OF PRESENT ILLNESS
[FreeTextEntry1] : \par This 53 y/o patient with adnexal mass is scheduled for Pelvic EUA D&C, Lap BS, Right oophorectomy, Possible left oophorectomy, possible hysterectomy, possible staging, possible cystoscopy, possible laparotomy on 2/3/23. She has a pre op telehealth visit today

## 2023-02-03 NOTE — BRIEF OPERATIVE NOTE - SPECIMENS
Right uterine serosal mass, right and left fallopian tubes, endometrial curettage, endometrial polyp

## 2023-02-03 NOTE — ASU DISCHARGE PLAN (ADULT/PEDIATRIC) - PROCEDURE
EXAM UNDER ANESTHESIA, HYSTEROSCOPY DILATION AND CURETTAGE, ROBOT ASSISTED BILATERAL SALPINGECTOMY, RESECTION OF UTERINE MASS

## 2023-02-03 NOTE — CHIEF COMPLAINT
[FreeTextEntry1] : [facility]\par [place] Office\par [address]\[place] [address]\par \par Adnexal mass

## 2023-02-03 NOTE — BRIEF OPERATIVE NOTE - COMMENTS
Pelvic washings obtained. Interceed placed along excision site Pelvic washings obtained. Interceed placed along excision site. Frozen section - degenerating  leiomyoma

## 2023-02-03 NOTE — DISCUSSION/SUMMARY
[Pre Op] : The differential diagnosis was discussed in detail. The indications, risks, benefits and alternatives were discussed. [unfilled] expressed an understanding of the treatment rationale and her questions were answered to her apparent satisfaction. [FreeTextEntry1] : \par Colonoscopy done \par \par Reviewed plan for LSC BS/R oophor/HSC, D&C/poss TLH, staging\par \par The surgical procedure, and recuperative issues were discussed at length.  Reviewed plan for frozen section pathologic evaluation of right adnexal mass with possible need for staging.

## 2023-02-03 NOTE — PHYSICAL EXAM
[Chaperone Present] : A chaperone was present in the examining room during all aspects of the physical examination [Abnormal] : Thyroid: Abnormal [Normal] : Recto-Vaginal Exam: Normal [FreeTextEntry1] : Blaire Parker PA-C [de-identified] : Naval piercing no longer used by pt [de-identified] : Anteverted uterus [de-identified] : Mass not overtly palpable, non tender.  [de-identified] : No RV nodularity/abnormality

## 2023-02-03 NOTE — PRE-OP CHECKLIST - PATIENT PROBLEMS/NEEDS
Patient expressed no known problems or needs Doxycycline Pregnancy And Lactation Text: This medication is Pregnancy Category D and not consider safe during pregnancy. It is also excreted in breast milk but is considered safe for shorter treatment courses.

## 2023-02-03 NOTE — REASON FOR VISIT
[Home] : at home, [unfilled] , at the time of the visit. [Medical Office: ([facility])___] : at the medical office located in  [Verbal consent obtained from patient] : the patient, [unfilled] [FreeTextEntry1] : Pre op discussion.

## 2023-02-03 NOTE — ASSESSMENT
[FreeTextEntry1] : 52 yo female with 6.1 heterogeneous R adnexal mass and thickened endometrial lining to 9 mm. Discussed with patient need for surgical intervention to remove adnexal mass, we discussed removal of bilateral fallopian tubes as well to decrease her risk of ovarian cancer. I advised her leaving her left ovary in place is my recommendation as this can decrease risk of other comorbidities associated with surgical menopause in a woman < 65 years old. In terms of her thick endometrium, we discussed performing D&C hysteroscopy, she has not had any post menopausal bleeding which is reassuring that there is a benign process occurring such as a polyp. She has one risk factor of developing hyperplasia/endometrial cancer, that being obesity (BMI 32). We discussed weight loss management to decrease risk of developing endometrial cancer. Alternatively, she could pursue a hysterectomy. I advised her should there be a concern for GYN malignancy, she will undergo a total hysterectomy with possible staging. We will rely on pre-op tumor markers to determine if her surgery should be scheduled ASAP (early January) vs delaying until mid January to February. Tumor markers will also help to determine if her case should be performed laparoscopically vs robotically, with robotic surgery being preferred should her tumor markers be abnormal, as there will likely be a role for staging. Due to her transverse colitis previously seen on CT and lack of screening colonoscopy, I would like her to have pre-op colonoscopy as well. All questions answered to patients apparent satisfaction. \par \par I discussed at length with the patient the nature, purpose, risks, benefits, and alternatives of Dilation and curettage, laparoscopic bilateral salpingectomy, right oophorectomy, possible staging, possible cystoscopy. The patient understands the risks to include (but not be limited to) bowel injury, bleeding (with the possible need for transfusion), bladder or ureteral injury, infections, deep venous thrombosis, and hiro-operative death.  The patient also understands that her surgery may not be able to be performed laparoscopically and that she may need a laparotomy.  She also understands the limitations of laparoscopic surgery and the possibility of missing a surgical complication with need for subsequent re-exploration.  She agrees to proceed.  She asked numerous questions which were answered to her satisfaction.  She understands the need for a pre-operative bowel preparation and agrees to comply with our instructions.

## 2023-02-04 VITALS
OXYGEN SATURATION: 95 % | DIASTOLIC BLOOD PRESSURE: 61 MMHG | SYSTOLIC BLOOD PRESSURE: 124 MMHG | RESPIRATION RATE: 15 BRPM | HEART RATE: 67 BPM

## 2023-02-04 RX ADMIN — Medication 1000 MILLIGRAM(S): at 00:00

## 2023-02-07 LAB — NON-GYNECOLOGICAL CYTOLOGY STUDY: SIGNIFICANT CHANGE UP

## 2023-02-09 LAB — SURGICAL PATHOLOGY STUDY: SIGNIFICANT CHANGE UP

## 2023-02-13 PROBLEM — N94.89 ADNEXAL MASS: Status: ACTIVE | Noted: 2022-12-09

## 2023-02-15 ENCOUNTER — APPOINTMENT (OUTPATIENT)
Dept: GYNECOLOGIC ONCOLOGY | Facility: CLINIC | Age: 53
End: 2023-02-15
Payer: COMMERCIAL

## 2023-02-15 VITALS
SYSTOLIC BLOOD PRESSURE: 110 MMHG | TEMPERATURE: 98.2 F | RESPIRATION RATE: 16 BRPM | HEIGHT: 66 IN | DIASTOLIC BLOOD PRESSURE: 82 MMHG | BODY MASS INDEX: 31.66 KG/M2 | WEIGHT: 197 LBS

## 2023-02-15 DIAGNOSIS — N94.89 OTHER SPECIFIED CONDITIONS ASSOCIATED WITH FEMALE GENITAL ORGANS AND MENSTRUAL CYCLE: ICD-10-CM

## 2023-02-15 PROCEDURE — 99024 POSTOP FOLLOW-UP VISIT: CPT

## 2023-02-24 NOTE — ASSESSMENT
[FreeTextEntry1] : The patient is healing well without complication. We discussed ongoing recuperation and reviewed final pathology results in detail - a copy was provided to the patient. We reviewed the need for ongoing GYN visits and a recommended plan to follow up with regular GYN. She should refrain from heavy lifting for 6-8 weeks from surgery & no swimming. Otherwise resume all activity. She is cleared to resume sexual intercourse but should be mindful of core muscles. The patient stated and expressed a good understanding of the above information.

## 2023-02-24 NOTE — REASON FOR VISIT
[Post Op] : post op visit [de-identified] : 2/3/23 [de-identified] : RA laparoscopic excision of adnexal uterine mass, bilateral salpingectomy, hysteroscopy with polypectomy & D&C at [facility] [de-identified] : The patient is healing remarkably well. She denies a change in appetite, or a change in weight. She is tolerating PO without nausea or vomiting. She denies VB/VD. She denies CP/SOB.

## 2023-02-24 NOTE — END OF VISIT
[FreeTextEntry3] : Written by Mónica Marroquin, acting as a scribe for Dr. Linnette Cooper.\par This note accurately reflects the work and decisions made by me.

## 2023-02-24 NOTE — DISCUSSION/SUMMARY
[Clean] : was clean [Dry] : was dry [Intact] : was intact [None] : had no drainage [Normal Skin] : normal appearance [Doing Well] : is doing well [Excellent Pain Control] : has excellent pain control [No Sign of Infection] : is showing no signs of infection [Erythema] : was not erythematous [Ecchymosis] : was not ecchymotic [Seroma] : had no seroma [Firm] : soft [Tender] : nontender [Abnormal Bowel Sounds] : normal bowel sounds [Rebound] : no rebound tenderness [Guarding] : no guarding [Incisional Hernia] : no incisional hernia [Mass] : no palpable mass [Cervical Abnormality] : normal cervix [External Genitalia Abnormal] : normal external genitalia [Vaginal Exam Abnormal] : normal vaginal exam [de-identified] : Mónica Marroquin MA was present the entire time of gynecological exam.  [FreeTextEntry1] : \par OPERATIVE FINDINGS:\par Right 5cm pedunculated uterine mass along cornua/fundus. \par Grossly normal appearing fallopian tubes and ovaries bilaterally. Endometrial polyp noted on hysteroscopy. \par \par PATHOLOGY:\par 1.  Right uterine, "serosal mass", excision:\par \par -   Degenerative leiomyoma.\par \par 2.  Right and left fallopian tubes, bilateral salpingectomy:\par -   Bilateral fallopian tubes with paratubal adhesions.\par \par 3.  Endometrium, curettings:\par -   Largely crush artifact admixed with superficial strips of\par inactive endometrium with tubal metaplasia.\par -   Fragments of benign endocervical glands.\par -   Strips of benign squamous epithelium.\par \par 4.  Endometrium, polyp, biopsy:\par -   Endometrial polyp.\par -   Strips of inactive endometrium with tubal metaplasia.\par -   Strips of benign squamous epithelium.\par -   Scant fragments of benign endocervical glands.\par \par PW: negative

## 2023-05-11 NOTE — H&P PST ADULT - NS PRO PASSIVE SMOKE EXP
Impression: Regular astigmatism, bilateral: H52.223. Plan: Discussed diagnosis in detail with patient. New glasses Rx was given today. New CL trials given today. Recommend UV protection. Potential for initial adaptation discussed. Patient will let me know if trials work.
No

## 2024-02-08 NOTE — PATIENT PROFILE ADULT - CHOOSE INDICATION TO IMMUNIZE (AN ORDER WILL BE GENERATED WHEN THIS NOTE IS SAVED):
PRINCIPAL PROCEDURE  Procedure: Xray of right foot, three weight-bearing views  Findings and Treatment: There is no fracture or dislocation. There is no radiographic evidence   for acute for myelitis. However, there is a small amount of air within   the soft tissues overlying the distal phalanx, possibly in keeping with   communication with overlying wound although correlation for sequelae of   infection with gas-forming organism may be of utility. There are   scattered degenerative changes.      SECONDARY PROCEDURE  Procedure: Ultrasound, duplex, venous, extremity, unilateral  Findings and Treatment: No evidence of right lower extremity deep venous thrombosis.     PRINCIPAL PROCEDURE  Procedure: Xray of right foot, three weight-bearing views  Findings and Treatment: There is no fracture or dislocation. There is no radiographic evidence   for acute for myelitis. However, there is a small amount of air within   the soft tissues overlying the distal phalanx, possibly in keeping with   communication with overlying wound although correlation for sequelae of infection with gas-forming organism may be of utility. There are   scattered degenerative changes.      SECONDARY PROCEDURE  Procedure: Ultrasound, duplex, venous, extremity, unilateral  Findings and Treatment: No evidence of right lower extremity deep venous thrombosis.     Patient is not pregnant (male or female)

## 2025-05-13 ENCOUNTER — APPOINTMENT (OUTPATIENT)
Dept: ORTHOPEDIC SURGERY | Facility: CLINIC | Age: 55
End: 2025-05-13

## 2025-05-13 VITALS — HEIGHT: 65.5 IN | BODY MASS INDEX: 22.22 KG/M2 | WEIGHT: 135 LBS

## 2025-05-13 DIAGNOSIS — Z00.00 ENCOUNTER FOR GENERAL ADULT MEDICAL EXAMINATION W/OUT ABNORMAL FINDINGS: ICD-10-CM

## 2025-05-13 DIAGNOSIS — S83.241A OTHER TEAR OF MEDIAL MENISCUS, CURRENT INJURY, RIGHT KNEE, INITIAL ENCOUNTER: ICD-10-CM

## 2025-05-13 PROCEDURE — 73564 X-RAY EXAM KNEE 4 OR MORE: CPT | Mod: RT

## 2025-05-13 PROCEDURE — 99204 OFFICE O/P NEW MOD 45 MIN: CPT

## 2025-05-13 RX ORDER — MELOXICAM 15 MG/1
15 TABLET ORAL
Qty: 30 | Refills: 0 | Status: ACTIVE | COMMUNITY
Start: 2025-05-13 | End: 2025-06-12

## 2025-05-16 ENCOUNTER — APPOINTMENT (OUTPATIENT)
Dept: MRI IMAGING | Facility: CLINIC | Age: 55
End: 2025-05-16
Payer: COMMERCIAL

## 2025-05-16 PROCEDURE — 73721 MRI JNT OF LWR EXTRE W/O DYE: CPT | Mod: RT

## 2025-05-23 ENCOUNTER — APPOINTMENT (OUTPATIENT)
Dept: ORTHOPEDIC SURGERY | Facility: CLINIC | Age: 55
End: 2025-05-23
Payer: COMMERCIAL

## 2025-05-23 DIAGNOSIS — M17.11 UNILATERAL PRIMARY OSTEOARTHRITIS, RIGHT KNEE: ICD-10-CM

## 2025-05-23 DIAGNOSIS — M23.91 UNSPECIFIED INTERNAL DERANGEMENT OF RIGHT KNEE: ICD-10-CM

## 2025-05-23 DIAGNOSIS — M65.969 UNSP SYNOVITIS AND TENOSYNOVITIS, UNSPECIFIED LOWER LEG: ICD-10-CM

## 2025-05-23 PROCEDURE — 99203 OFFICE O/P NEW LOW 30 MIN: CPT

## (undated) DEVICE — FOLEY TRAY 16FR 5CC LF UMETER CLOSED

## (undated) DEVICE — POSITIONER PINK PAD PIGAZZI SYSTEM

## (undated) DEVICE — PACK GENERAL LAPAROSCOPY

## (undated) DEVICE — PREP BETADINE SPONGE STICKS

## (undated) DEVICE — LIGASURE BLUNT TIP 37CM

## (undated) DEVICE — SUT VICRYL 0 27" CT-1 UNDYED

## (undated) DEVICE — SUT VICRYL 0 27" UR-6

## (undated) DEVICE — TUBING HYDRO-SURG PLUS IRRIGATOR W SMOKEVAC & PROBE

## (undated) DEVICE — CANISTER DISPOSABLE THIN WALL 3000CC

## (undated) DEVICE — UTERINE MANIPULATOR CONMED VCARE MED 34MM

## (undated) DEVICE — TUBING INSUFFLATION LAP FILTER 10FT

## (undated) DEVICE — TROCAR COVIDIEN VERSAPORT BLADELESS OPTICAL 5MM STANDARD

## (undated) DEVICE — DRSG KERLIX ROLL 4.5"

## (undated) DEVICE — TIP METZENBAUM SCISSOR MONOPOLAR ENDOCUT (ORANGE)

## (undated) DEVICE — UTERINE MANIPULATOR CONMED VCARE SM 32MM

## (undated) DEVICE — LIGASURE IMPACT

## (undated) DEVICE — ELCTR BOVIE TIP BLADE INSULATED 2.75" EDGE

## (undated) DEVICE — SOL IRR POUR NS 0.9% 500ML

## (undated) DEVICE — VENODYNE/SCD SLEEVE CALF MEDIUM

## (undated) DEVICE — POSITIONER PURPLE ARM ONE STEP (LARGE)

## (undated) DEVICE — SOL IRR POUR LR 1000ML

## (undated) DEVICE — TUBING OLYMPUS INSUFFLATION

## (undated) DEVICE — BASIN SET SINGLE

## (undated) DEVICE — TROCAR COVIDIEN BLUNT TIP HASSAN 10MM

## (undated) DEVICE — SUT MONOCRYL 4-0 27" PS-2 UNDYED

## (undated) DEVICE — GLV 6.5 PROTEXIS (WHITE)

## (undated) DEVICE — ELCTR GROUNDING PAD ADULT COVIDIEN

## (undated) DEVICE — DRSG OPSITE 2.5 X 2"

## (undated) DEVICE — PACK PERI GYN

## (undated) DEVICE — BLADE SURGICAL #15 CARBON

## (undated) DEVICE — DRSG TEGADERM 1.75X1.75"

## (undated) DEVICE — DRSG TELFA 3 X 8

## (undated) DEVICE — NDL COUNTER FOAM AND MAGNET 10-20

## (undated) DEVICE — POSITIONER STRAP ARMBOARD VELCRO TS-30

## (undated) DEVICE — LABELS BLANK W PEN

## (undated) DEVICE — GLV 7 PROTEXIS (BLUE)